# Patient Record
Sex: MALE | Race: BLACK OR AFRICAN AMERICAN | NOT HISPANIC OR LATINO | Employment: UNEMPLOYED | URBAN - METROPOLITAN AREA
[De-identification: names, ages, dates, MRNs, and addresses within clinical notes are randomized per-mention and may not be internally consistent; named-entity substitution may affect disease eponyms.]

---

## 2017-01-06 ENCOUNTER — COMMUNICATION - HEALTHEAST (OUTPATIENT)
Dept: INTERNAL MEDICINE | Facility: CLINIC | Age: 62
End: 2017-01-06

## 2017-01-14 ENCOUNTER — COMMUNICATION - HEALTHEAST (OUTPATIENT)
Dept: INTERNAL MEDICINE | Facility: CLINIC | Age: 62
End: 2017-01-14

## 2017-01-18 ENCOUNTER — OFFICE VISIT - HEALTHEAST (OUTPATIENT)
Dept: INTERNAL MEDICINE | Facility: CLINIC | Age: 62
End: 2017-01-18

## 2017-01-18 DIAGNOSIS — E11.9 TYPE 2 DIABETES MELLITUS (H): ICD-10-CM

## 2017-01-18 LAB — HBA1C MFR BLD: 7.8 % (ref 3.5–6)

## 2017-01-25 ENCOUNTER — COMMUNICATION - HEALTHEAST (OUTPATIENT)
Dept: INTERNAL MEDICINE | Facility: CLINIC | Age: 62
End: 2017-01-25

## 2017-01-26 ENCOUNTER — COMMUNICATION - HEALTHEAST (OUTPATIENT)
Dept: INTERNAL MEDICINE | Facility: CLINIC | Age: 62
End: 2017-01-26

## 2017-01-30 ENCOUNTER — COMMUNICATION - HEALTHEAST (OUTPATIENT)
Dept: EDUCATION SERVICES | Facility: CLINIC | Age: 62
End: 2017-01-30

## 2017-01-30 ENCOUNTER — AMBULATORY - HEALTHEAST (OUTPATIENT)
Dept: EDUCATION SERVICES | Facility: CLINIC | Age: 62
End: 2017-01-30

## 2017-01-30 DIAGNOSIS — E11.39 TYPE 2 DIABETES MELLITUS WITH OTHER OPHTHALMIC COMPLICATION, WITHOUT LONG-TERM CURRENT USE OF INSULIN (H): ICD-10-CM

## 2017-04-18 ENCOUNTER — COMMUNICATION - HEALTHEAST (OUTPATIENT)
Dept: INTERNAL MEDICINE | Facility: CLINIC | Age: 62
End: 2017-04-18

## 2017-04-18 DIAGNOSIS — E11.39 TYPE 2 DIABETES MELLITUS WITH OTHER OPHTHALMIC COMPLICATION, WITHOUT LONG-TERM CURRENT USE OF INSULIN (H): ICD-10-CM

## 2017-04-24 ENCOUNTER — AMBULATORY - HEALTHEAST (OUTPATIENT)
Dept: EDUCATION SERVICES | Facility: CLINIC | Age: 62
End: 2017-04-24

## 2017-04-24 ENCOUNTER — OFFICE VISIT - HEALTHEAST (OUTPATIENT)
Dept: INTERNAL MEDICINE | Facility: CLINIC | Age: 62
End: 2017-04-24

## 2017-04-24 DIAGNOSIS — E11.39 TYPE 2 DIABETES MELLITUS WITH OTHER OPHTHALMIC COMPLICATION, WITHOUT LONG-TERM CURRENT USE OF INSULIN (H): ICD-10-CM

## 2017-04-24 DIAGNOSIS — E11.9 TYPE 2 DIABETES MELLITUS WITHOUT COMPLICATION, WITHOUT LONG-TERM CURRENT USE OF INSULIN (H): ICD-10-CM

## 2017-04-24 DIAGNOSIS — R63.4 WEIGHT LOSS: ICD-10-CM

## 2017-04-24 LAB — HBA1C MFR BLD: 7 % (ref 3.5–6)

## 2017-04-24 ASSESSMENT — MIFFLIN-ST. JEOR: SCORE: 1552.36

## 2017-04-25 ENCOUNTER — COMMUNICATION - HEALTHEAST (OUTPATIENT)
Dept: INTERNAL MEDICINE | Facility: CLINIC | Age: 62
End: 2017-04-25

## 2017-04-25 ENCOUNTER — HOME CARE/HOSPICE - HEALTHEAST (OUTPATIENT)
Dept: HOME HEALTH SERVICES | Facility: HOME HEALTH | Age: 62
End: 2017-04-25

## 2017-04-25 DIAGNOSIS — H35.52 RETINITIS PIGMENTOSA: ICD-10-CM

## 2017-04-25 DIAGNOSIS — E11.9 T2DM (TYPE 2 DIABETES MELLITUS) (H): ICD-10-CM

## 2017-04-25 DIAGNOSIS — E87.6 HYPOKALEMIA: ICD-10-CM

## 2017-04-26 ENCOUNTER — COMMUNICATION - HEALTHEAST (OUTPATIENT)
Dept: HOME HEALTH SERVICES | Facility: HOME HEALTH | Age: 62
End: 2017-04-26

## 2017-04-27 ENCOUNTER — COMMUNICATION - HEALTHEAST (OUTPATIENT)
Dept: HOME HEALTH SERVICES | Facility: HOME HEALTH | Age: 62
End: 2017-04-27

## 2017-04-28 ENCOUNTER — COMMUNICATION - HEALTHEAST (OUTPATIENT)
Dept: ADMINISTRATIVE | Facility: CLINIC | Age: 62
End: 2017-04-28

## 2017-04-28 ENCOUNTER — HOME CARE/HOSPICE - HEALTHEAST (OUTPATIENT)
Dept: HOME HEALTH SERVICES | Facility: HOME HEALTH | Age: 62
End: 2017-04-28

## 2017-04-28 ENCOUNTER — COMMUNICATION - HEALTHEAST (OUTPATIENT)
Dept: SCHEDULING | Facility: CLINIC | Age: 62
End: 2017-04-28

## 2017-04-28 DIAGNOSIS — E11.39 TYPE 2 DIABETES MELLITUS WITH OTHER OPHTHALMIC COMPLICATION, WITHOUT LONG-TERM CURRENT USE OF INSULIN (H): ICD-10-CM

## 2017-04-30 ENCOUNTER — COMMUNICATION - HEALTHEAST (OUTPATIENT)
Dept: SCHEDULING | Facility: CLINIC | Age: 62
End: 2017-04-30

## 2017-05-01 ENCOUNTER — COMMUNICATION - HEALTHEAST (OUTPATIENT)
Dept: SCHEDULING | Facility: CLINIC | Age: 62
End: 2017-05-01

## 2017-05-01 ENCOUNTER — HOME CARE/HOSPICE - HEALTHEAST (OUTPATIENT)
Dept: HOME HEALTH SERVICES | Facility: HOME HEALTH | Age: 62
End: 2017-05-01

## 2017-05-01 ENCOUNTER — RECORDS - HEALTHEAST (OUTPATIENT)
Dept: ADMINISTRATIVE | Facility: OTHER | Age: 62
End: 2017-05-01

## 2017-05-01 DIAGNOSIS — E11.9 T2DM (TYPE 2 DIABETES MELLITUS) (H): ICD-10-CM

## 2017-05-02 ENCOUNTER — HOME CARE/HOSPICE - HEALTHEAST (OUTPATIENT)
Dept: HOME HEALTH SERVICES | Facility: HOME HEALTH | Age: 62
End: 2017-05-02

## 2017-05-03 ENCOUNTER — HOME CARE/HOSPICE - HEALTHEAST (OUTPATIENT)
Dept: HOME HEALTH SERVICES | Facility: HOME HEALTH | Age: 62
End: 2017-05-03

## 2017-05-04 ENCOUNTER — HOME CARE/HOSPICE - HEALTHEAST (OUTPATIENT)
Dept: HOME HEALTH SERVICES | Facility: HOME HEALTH | Age: 62
End: 2017-05-04

## 2017-05-08 ENCOUNTER — HOME CARE/HOSPICE - HEALTHEAST (OUTPATIENT)
Dept: HOME HEALTH SERVICES | Facility: HOME HEALTH | Age: 62
End: 2017-05-08

## 2017-05-10 ENCOUNTER — HOME CARE/HOSPICE - HEALTHEAST (OUTPATIENT)
Dept: HOME HEALTH SERVICES | Facility: HOME HEALTH | Age: 62
End: 2017-05-10

## 2017-05-15 ENCOUNTER — HOME CARE/HOSPICE - HEALTHEAST (OUTPATIENT)
Dept: HOME HEALTH SERVICES | Facility: HOME HEALTH | Age: 62
End: 2017-05-15

## 2017-05-16 ENCOUNTER — RECORDS - HEALTHEAST (OUTPATIENT)
Dept: ADMINISTRATIVE | Facility: OTHER | Age: 62
End: 2017-05-16

## 2017-05-16 ENCOUNTER — HOME CARE/HOSPICE - HEALTHEAST (OUTPATIENT)
Dept: HOME HEALTH SERVICES | Facility: HOME HEALTH | Age: 62
End: 2017-05-16

## 2017-05-17 ENCOUNTER — HOME CARE/HOSPICE - HEALTHEAST (OUTPATIENT)
Dept: HOME HEALTH SERVICES | Facility: HOME HEALTH | Age: 62
End: 2017-05-17

## 2017-05-22 ENCOUNTER — HOME CARE/HOSPICE - HEALTHEAST (OUTPATIENT)
Dept: HOME HEALTH SERVICES | Facility: HOME HEALTH | Age: 62
End: 2017-05-22

## 2017-05-23 ENCOUNTER — RECORDS - HEALTHEAST (OUTPATIENT)
Dept: ADMINISTRATIVE | Facility: OTHER | Age: 62
End: 2017-05-23

## 2017-05-23 ENCOUNTER — HOME CARE/HOSPICE - HEALTHEAST (OUTPATIENT)
Dept: HOME HEALTH SERVICES | Facility: HOME HEALTH | Age: 62
End: 2017-05-23

## 2017-05-24 ENCOUNTER — COMMUNICATION - HEALTHEAST (OUTPATIENT)
Dept: HOME HEALTH SERVICES | Facility: HOME HEALTH | Age: 62
End: 2017-05-24

## 2017-05-25 ENCOUNTER — HOME CARE/HOSPICE - HEALTHEAST (OUTPATIENT)
Dept: HOME HEALTH SERVICES | Facility: HOME HEALTH | Age: 62
End: 2017-05-25

## 2017-05-29 ENCOUNTER — HOME CARE/HOSPICE - HEALTHEAST (OUTPATIENT)
Dept: HOME HEALTH SERVICES | Facility: HOME HEALTH | Age: 62
End: 2017-05-29

## 2017-05-31 ENCOUNTER — HOME CARE/HOSPICE - HEALTHEAST (OUTPATIENT)
Dept: HOME HEALTH SERVICES | Facility: HOME HEALTH | Age: 62
End: 2017-05-31

## 2017-06-03 ENCOUNTER — HOME CARE/HOSPICE - HEALTHEAST (OUTPATIENT)
Dept: HOME HEALTH SERVICES | Facility: HOME HEALTH | Age: 62
End: 2017-06-03

## 2017-06-05 ENCOUNTER — HOME CARE/HOSPICE - HEALTHEAST (OUTPATIENT)
Dept: HOME HEALTH SERVICES | Facility: HOME HEALTH | Age: 62
End: 2017-06-05

## 2017-06-05 ENCOUNTER — COMMUNICATION - HEALTHEAST (OUTPATIENT)
Dept: HOME HEALTH SERVICES | Facility: HOME HEALTH | Age: 62
End: 2017-06-05

## 2017-06-12 ENCOUNTER — HOME CARE/HOSPICE - HEALTHEAST (OUTPATIENT)
Dept: HOME HEALTH SERVICES | Facility: HOME HEALTH | Age: 62
End: 2017-06-12

## 2017-06-14 ENCOUNTER — OFFICE VISIT - HEALTHEAST (OUTPATIENT)
Dept: FAMILY MEDICINE | Facility: CLINIC | Age: 62
End: 2017-06-14

## 2017-06-14 DIAGNOSIS — H35.52 PIGMENTARY RETINAL DYSTROPHY: ICD-10-CM

## 2017-06-14 DIAGNOSIS — E78.01 FAMILIAL HYPERCHOLESTEROLEMIA: ICD-10-CM

## 2017-06-14 DIAGNOSIS — R04.0 EPISTAXIS: ICD-10-CM

## 2017-06-14 DIAGNOSIS — H91.93 HEARING LOSS, BILATERAL: ICD-10-CM

## 2017-06-14 DIAGNOSIS — R61 UNEXPLAINED NIGHT SWEATS: ICD-10-CM

## 2017-06-14 DIAGNOSIS — R35.0 URINARY FREQUENCY: ICD-10-CM

## 2017-06-14 DIAGNOSIS — E11.39 TYPE 2 DIABETES MELLITUS WITH OTHER OPHTHALMIC COMPLICATION, WITHOUT LONG-TERM CURRENT USE OF INSULIN (H): ICD-10-CM

## 2017-06-14 ASSESSMENT — MIFFLIN-ST. JEOR: SCORE: 1586.39

## 2017-06-19 ENCOUNTER — HOME CARE/HOSPICE - HEALTHEAST (OUTPATIENT)
Dept: HOME HEALTH SERVICES | Facility: HOME HEALTH | Age: 62
End: 2017-06-19

## 2017-06-22 ENCOUNTER — OFFICE VISIT - HEALTHEAST (OUTPATIENT)
Dept: NURSING | Facility: CLINIC | Age: 62
End: 2017-06-22

## 2017-06-22 ENCOUNTER — HOME CARE/HOSPICE - HEALTHEAST (OUTPATIENT)
Dept: HOME HEALTH SERVICES | Facility: HOME HEALTH | Age: 62
End: 2017-06-22

## 2017-06-22 DIAGNOSIS — E11.39 TYPE 2 DIABETES MELLITUS WITH OTHER OPHTHALMIC COMPLICATION, WITHOUT LONG-TERM CURRENT USE OF INSULIN (H): ICD-10-CM

## 2017-06-22 DIAGNOSIS — R61 UNEXPLAINED NIGHT SWEATS: ICD-10-CM

## 2017-06-22 DIAGNOSIS — I10 ESSENTIAL HYPERTENSION WITH GOAL BLOOD PRESSURE LESS THAN 140/90: ICD-10-CM

## 2017-06-22 DIAGNOSIS — Z00.00 PREVENTATIVE HEALTH CARE: ICD-10-CM

## 2017-06-22 DIAGNOSIS — R80.9 MICROALBUMINURIA: ICD-10-CM

## 2017-06-22 LAB
CHOLEST SERPL-MCNC: 137 MG/DL
FASTING STATUS PATIENT QL REPORTED: NO
HDLC SERPL-MCNC: 39 MG/DL
LDLC SERPL CALC-MCNC: 80 MG/DL
TRIGL SERPL-MCNC: 88 MG/DL

## 2017-06-23 ENCOUNTER — COMMUNICATION - HEALTHEAST (OUTPATIENT)
Dept: NURSING | Facility: CLINIC | Age: 62
End: 2017-06-23

## 2017-06-26 ENCOUNTER — HOME CARE/HOSPICE - HEALTHEAST (OUTPATIENT)
Dept: HOME HEALTH SERVICES | Facility: HOME HEALTH | Age: 62
End: 2017-06-26

## 2017-07-20 ENCOUNTER — OFFICE VISIT - HEALTHEAST (OUTPATIENT)
Dept: FAMILY MEDICINE | Facility: CLINIC | Age: 62
End: 2017-07-20

## 2017-07-20 ENCOUNTER — AMBULATORY - HEALTHEAST (OUTPATIENT)
Dept: FAMILY MEDICINE | Facility: CLINIC | Age: 62
End: 2017-07-20

## 2017-07-20 DIAGNOSIS — E11.9 DIABETES MELLITUS (H): ICD-10-CM

## 2017-07-20 DIAGNOSIS — B35.1 ONYCHOMYCOSIS OF TOENAIL: ICD-10-CM

## 2017-07-20 DIAGNOSIS — H35.52 PIGMENTARY RETINAL DYSTROPHY: ICD-10-CM

## 2017-07-20 LAB — HBA1C MFR BLD: 7.2 % (ref 3.5–6)

## 2017-07-20 ASSESSMENT — MIFFLIN-ST. JEOR: SCORE: 1586.39

## 2017-07-31 ENCOUNTER — OFFICE VISIT - HEALTHEAST (OUTPATIENT)
Dept: PODIATRY | Facility: CLINIC | Age: 62
End: 2017-07-31

## 2017-07-31 DIAGNOSIS — L60.2 ONYCHAUXIS: ICD-10-CM

## 2017-07-31 DIAGNOSIS — B35.1 NAIL FUNGUS: ICD-10-CM

## 2017-08-12 ENCOUNTER — COMMUNICATION - HEALTHEAST (OUTPATIENT)
Dept: FAMILY MEDICINE | Facility: CLINIC | Age: 62
End: 2017-08-12

## 2017-08-12 DIAGNOSIS — F10.920 ALCOHOL INTOXICATION, UNCOMPLICATED (H): ICD-10-CM

## 2017-08-24 ENCOUNTER — COMMUNICATION - HEALTHEAST (OUTPATIENT)
Dept: ADMINISTRATIVE | Facility: CLINIC | Age: 62
End: 2017-08-24

## 2017-08-29 ENCOUNTER — COMMUNICATION - HEALTHEAST (OUTPATIENT)
Dept: ADMINISTRATIVE | Facility: CLINIC | Age: 62
End: 2017-08-29

## 2017-09-07 ENCOUNTER — COMMUNICATION - HEALTHEAST (OUTPATIENT)
Dept: FAMILY MEDICINE | Facility: CLINIC | Age: 62
End: 2017-09-07

## 2017-09-19 ENCOUNTER — COMMUNICATION - HEALTHEAST (OUTPATIENT)
Dept: SCHEDULING | Facility: CLINIC | Age: 62
End: 2017-09-19

## 2017-10-10 ENCOUNTER — COMMUNICATION - HEALTHEAST (OUTPATIENT)
Dept: SCHEDULING | Facility: CLINIC | Age: 62
End: 2017-10-10

## 2017-10-10 DIAGNOSIS — I10 HTN (HYPERTENSION): ICD-10-CM

## 2017-10-10 DIAGNOSIS — E11.9 DIABETES (H): ICD-10-CM

## 2017-10-20 ENCOUNTER — AMBULATORY - HEALTHEAST (OUTPATIENT)
Dept: LAB | Facility: CLINIC | Age: 62
End: 2017-10-20

## 2017-10-20 ENCOUNTER — COMMUNICATION - HEALTHEAST (OUTPATIENT)
Dept: FAMILY MEDICINE | Facility: CLINIC | Age: 62
End: 2017-10-20

## 2017-10-20 DIAGNOSIS — E78.5 HYPERLIPIDEMIA: ICD-10-CM

## 2017-10-20 DIAGNOSIS — E11.9 DIABETES (H): ICD-10-CM

## 2017-10-20 DIAGNOSIS — I10 HTN (HYPERTENSION): ICD-10-CM

## 2017-10-20 LAB — HBA1C MFR BLD: 6.5 % (ref 3.5–6)

## 2017-11-07 ENCOUNTER — COMMUNICATION - HEALTHEAST (OUTPATIENT)
Dept: FAMILY MEDICINE | Facility: CLINIC | Age: 62
End: 2017-11-07

## 2017-11-14 ENCOUNTER — COMMUNICATION - HEALTHEAST (OUTPATIENT)
Dept: FAMILY MEDICINE | Facility: CLINIC | Age: 62
End: 2017-11-14

## 2017-11-14 ENCOUNTER — OFFICE VISIT - HEALTHEAST (OUTPATIENT)
Dept: FAMILY MEDICINE | Facility: CLINIC | Age: 62
End: 2017-11-14

## 2017-11-14 ENCOUNTER — AMBULATORY - HEALTHEAST (OUTPATIENT)
Dept: FAMILY MEDICINE | Facility: CLINIC | Age: 62
End: 2017-11-14

## 2017-11-14 DIAGNOSIS — R63.0 APPETITE LOSS: ICD-10-CM

## 2017-11-14 DIAGNOSIS — E11.9 T2DM (TYPE 2 DIABETES MELLITUS) (H): ICD-10-CM

## 2017-11-14 DIAGNOSIS — Z23 NEED FOR IMMUNIZATION AGAINST INFLUENZA: ICD-10-CM

## 2017-11-14 DIAGNOSIS — H35.52 PIGMENTARY RETINAL DYSTROPHY: ICD-10-CM

## 2017-11-14 DIAGNOSIS — H61.20 CERUMEN IMPACTION: ICD-10-CM

## 2017-11-14 DIAGNOSIS — H91.90 HEARING LOSS: ICD-10-CM

## 2017-11-14 ASSESSMENT — MIFFLIN-ST. JEOR: SCORE: 1554.64

## 2018-01-22 ENCOUNTER — AMBULATORY - HEALTHEAST (OUTPATIENT)
Dept: PODIATRY | Facility: CLINIC | Age: 63
End: 2018-01-22

## 2018-01-22 DIAGNOSIS — L60.2 ONYCHAUXIS: ICD-10-CM

## 2018-01-22 DIAGNOSIS — E11.9 TYPE 2 DIABETES MELLITUS WITHOUT COMPLICATION, WITHOUT LONG-TERM CURRENT USE OF INSULIN (H): ICD-10-CM

## 2018-01-22 DIAGNOSIS — B35.1 NAIL FUNGUS: ICD-10-CM

## 2018-01-29 ENCOUNTER — COMMUNICATION - HEALTHEAST (OUTPATIENT)
Dept: FAMILY MEDICINE | Facility: CLINIC | Age: 63
End: 2018-01-29

## 2018-01-29 DIAGNOSIS — E78.5 HYPERLIPIDEMIA: ICD-10-CM

## 2018-02-07 ENCOUNTER — AMBULATORY - HEALTHEAST (OUTPATIENT)
Dept: FAMILY MEDICINE | Facility: CLINIC | Age: 63
End: 2018-02-07

## 2018-02-07 ENCOUNTER — COMMUNICATION - HEALTHEAST (OUTPATIENT)
Dept: FAMILY MEDICINE | Facility: CLINIC | Age: 63
End: 2018-02-07

## 2018-02-07 ENCOUNTER — OFFICE VISIT - HEALTHEAST (OUTPATIENT)
Dept: FAMILY MEDICINE | Facility: CLINIC | Age: 63
End: 2018-02-07

## 2018-02-07 DIAGNOSIS — E11.39 TYPE 2 DIABETES MELLITUS WITH OTHER OPHTHALMIC COMPLICATION, WITHOUT LONG-TERM CURRENT USE OF INSULIN (H): ICD-10-CM

## 2018-02-07 DIAGNOSIS — R42 DIZZINESS: ICD-10-CM

## 2018-02-07 DIAGNOSIS — F32.A DEPRESSIVE DISORDER: ICD-10-CM

## 2018-02-07 DIAGNOSIS — E11.9 DIABETES MELLITUS TYPE 2 IN NONOBESE (H): ICD-10-CM

## 2018-02-07 DIAGNOSIS — H35.52 PIGMENTARY RETINAL DYSTROPHY: ICD-10-CM

## 2018-02-07 DIAGNOSIS — N52.9 IMPOTENCE: ICD-10-CM

## 2018-02-07 LAB — HBA1C MFR BLD: 6.9 % (ref 3.5–6)

## 2018-02-07 ASSESSMENT — MIFFLIN-ST. JEOR: SCORE: 1519.48

## 2018-02-13 ENCOUNTER — COMMUNICATION - HEALTHEAST (OUTPATIENT)
Dept: FAMILY MEDICINE | Facility: CLINIC | Age: 63
End: 2018-02-13

## 2018-04-02 ENCOUNTER — COMMUNICATION - HEALTHEAST (OUTPATIENT)
Dept: FAMILY MEDICINE | Facility: CLINIC | Age: 63
End: 2018-04-02

## 2018-04-02 DIAGNOSIS — F10.920 ALCOHOL INTOXICATION, UNCOMPLICATED (H): ICD-10-CM

## 2018-04-23 ENCOUNTER — AMBULATORY - HEALTHEAST (OUTPATIENT)
Dept: PODIATRY | Facility: CLINIC | Age: 63
End: 2018-04-23

## 2018-04-23 DIAGNOSIS — L60.2 ONYCHAUXIS: ICD-10-CM

## 2018-04-23 DIAGNOSIS — E11.9 TYPE 2 DIABETES MELLITUS WITHOUT COMPLICATION, WITHOUT LONG-TERM CURRENT USE OF INSULIN (H): ICD-10-CM

## 2018-04-23 DIAGNOSIS — B35.1 NAIL FUNGUS: ICD-10-CM

## 2018-04-26 ENCOUNTER — OFFICE VISIT - HEALTHEAST (OUTPATIENT)
Dept: FAMILY MEDICINE | Facility: CLINIC | Age: 63
End: 2018-04-26

## 2018-04-26 DIAGNOSIS — H91.90 HEARING LOSS: ICD-10-CM

## 2018-04-26 DIAGNOSIS — E11.39 TYPE 2 DIABETES MELLITUS WITH OTHER OPHTHALMIC COMPLICATION, WITHOUT LONG-TERM CURRENT USE OF INSULIN (H): ICD-10-CM

## 2018-04-26 DIAGNOSIS — F32.A DEPRESSIVE DISORDER: ICD-10-CM

## 2018-04-26 DIAGNOSIS — H35.52 PIGMENTARY RETINAL DYSTROPHY: ICD-10-CM

## 2018-04-26 ASSESSMENT — MIFFLIN-ST. JEOR: SCORE: 1553.5

## 2018-05-18 ENCOUNTER — OFFICE VISIT - HEALTHEAST (OUTPATIENT)
Dept: FAMILY MEDICINE | Facility: CLINIC | Age: 63
End: 2018-05-18

## 2018-05-18 ENCOUNTER — COMMUNICATION - HEALTHEAST (OUTPATIENT)
Dept: FAMILY MEDICINE | Facility: CLINIC | Age: 63
End: 2018-05-18

## 2018-05-18 DIAGNOSIS — E78.01 FAMILIAL HYPERCHOLESTEROLEMIA: ICD-10-CM

## 2018-05-18 DIAGNOSIS — F10.19 ALCOHOL ABUSE WITH ALCOHOL-INDUCED DISORDER (H): ICD-10-CM

## 2018-05-18 DIAGNOSIS — R35.0 URINARY FREQUENCY: ICD-10-CM

## 2018-05-18 DIAGNOSIS — R19.00 ABDOMINAL MASS: ICD-10-CM

## 2018-05-18 DIAGNOSIS — R04.0 BLEEDING NOSE: ICD-10-CM

## 2018-05-18 DIAGNOSIS — K92.2 GI BLEED: ICD-10-CM

## 2018-05-18 DIAGNOSIS — E11.9 DIABETES MELLITUS (H): ICD-10-CM

## 2018-05-18 DIAGNOSIS — R42 DIZZINESS: ICD-10-CM

## 2018-05-18 DIAGNOSIS — E11.39 TYPE 2 DIABETES MELLITUS WITH OTHER OPHTHALMIC COMPLICATION, WITHOUT LONG-TERM CURRENT USE OF INSULIN (H): ICD-10-CM

## 2018-05-18 DIAGNOSIS — H35.52 PIGMENTARY RETINAL DYSTROPHY: ICD-10-CM

## 2018-05-18 LAB
ALBUMIN UR-MCNC: NEGATIVE MG/DL
ANION GAP SERPL CALCULATED.3IONS-SCNC: 10 MMOL/L (ref 5–18)
APPEARANCE UR: CLEAR
BASOPHILS # BLD AUTO: 0 THOU/UL (ref 0–0.2)
BASOPHILS NFR BLD AUTO: 0 % (ref 0–2)
BILIRUB UR QL STRIP: NEGATIVE
BUN SERPL-MCNC: 6 MG/DL (ref 8–22)
CALCIUM SERPL-MCNC: 8.8 MG/DL (ref 8.5–10.5)
CHLORIDE BLD-SCNC: 102 MMOL/L (ref 98–107)
CHOLEST SERPL-MCNC: 154 MG/DL
CO2 SERPL-SCNC: 23 MMOL/L (ref 22–31)
COLOR UR AUTO: YELLOW
CREAT SERPL-MCNC: 0.69 MG/DL (ref 0.7–1.3)
EOSINOPHIL # BLD AUTO: 0.2 THOU/UL (ref 0–0.4)
EOSINOPHIL NFR BLD AUTO: 2 % (ref 0–6)
ERYTHROCYTE [DISTWIDTH] IN BLOOD BY AUTOMATED COUNT: 13.1 % (ref 11–14.5)
FASTING STATUS PATIENT QL REPORTED: NO
GFR SERPL CREATININE-BSD FRML MDRD: >60 ML/MIN/1.73M2
GLUCOSE BLD-MCNC: 115 MG/DL (ref 70–125)
GLUCOSE UR STRIP-MCNC: ABNORMAL MG/DL
HBA1C MFR BLD: 6.3 % (ref 3.5–6)
HCT VFR BLD AUTO: 28 % (ref 40–54)
HDLC SERPL-MCNC: 45 MG/DL
HGB BLD-MCNC: 9.3 G/DL (ref 14–18)
HGB UR QL STRIP: NEGATIVE
KETONES UR STRIP-MCNC: NEGATIVE MG/DL
LDLC SERPL CALC-MCNC: 96 MG/DL
LEUKOCYTE ESTERASE UR QL STRIP: NEGATIVE
LYMPHOCYTES # BLD AUTO: 2.4 THOU/UL (ref 0.8–4.4)
LYMPHOCYTES NFR BLD AUTO: 29 % (ref 20–40)
MCH RBC QN AUTO: 30.7 PG (ref 27–34)
MCHC RBC AUTO-ENTMCNC: 33.3 G/DL (ref 32–36)
MCV RBC AUTO: 92 FL (ref 80–100)
MONOCYTES # BLD AUTO: 1 THOU/UL (ref 0–0.9)
MONOCYTES NFR BLD AUTO: 11 % (ref 2–10)
NEUTROPHILS # BLD AUTO: 5 THOU/UL (ref 2–7.7)
NEUTROPHILS NFR BLD AUTO: 58 % (ref 50–70)
NITRATE UR QL: NEGATIVE
PH UR STRIP: 7 [PH] (ref 5–8)
PLATELET # BLD AUTO: 414 THOU/UL (ref 140–440)
PMV BLD AUTO: 7.6 FL (ref 7–10)
POTASSIUM BLD-SCNC: 4.6 MMOL/L (ref 3.5–5)
RBC # BLD AUTO: 3.04 MILL/UL (ref 4.4–6.2)
SODIUM SERPL-SCNC: 135 MMOL/L (ref 136–145)
SP GR UR STRIP: 1.02 (ref 1–1.03)
TRIGL SERPL-MCNC: 67 MG/DL
UROBILINOGEN UR STRIP-ACNC: ABNORMAL
WBC: 8.6 THOU/UL (ref 4–11)

## 2018-05-18 ASSESSMENT — MIFFLIN-ST. JEOR: SCORE: 1576.18

## 2018-05-21 ENCOUNTER — TRANSFERRED RECORDS (OUTPATIENT)
Dept: HEALTH INFORMATION MANAGEMENT | Facility: CLINIC | Age: 63
End: 2018-05-21

## 2018-05-25 ENCOUNTER — HOSPITAL ENCOUNTER (OUTPATIENT)
Dept: CT IMAGING | Facility: CLINIC | Age: 63
Discharge: HOME | End: 2018-05-25
Attending: FAMILY MEDICINE

## 2018-05-25 DIAGNOSIS — R19.00 ABDOMINAL MASS: ICD-10-CM

## 2018-05-25 DIAGNOSIS — R42 DIZZINESS: ICD-10-CM

## 2018-05-25 DIAGNOSIS — H35.52 PIGMENTARY RETINAL DYSTROPHY: ICD-10-CM

## 2018-05-25 DIAGNOSIS — E11.39 TYPE 2 DIABETES MELLITUS WITH OTHER OPHTHALMIC COMPLICATION, WITHOUT LONG-TERM CURRENT USE OF INSULIN (H): ICD-10-CM

## 2018-05-25 DIAGNOSIS — F10.19 ALCOHOL ABUSE WITH ALCOHOL-INDUCED DISORDER (H): ICD-10-CM

## 2018-06-28 ENCOUNTER — COMMUNICATION - HEALTHEAST (OUTPATIENT)
Dept: FAMILY MEDICINE | Facility: CLINIC | Age: 63
End: 2018-06-28

## 2018-07-17 ENCOUNTER — COMMUNICATION - HEALTHEAST (OUTPATIENT)
Dept: FAMILY MEDICINE | Facility: CLINIC | Age: 63
End: 2018-07-17

## 2018-07-17 DIAGNOSIS — J30.2 SEASONAL ALLERGIC RHINITIS: ICD-10-CM

## 2018-07-26 ENCOUNTER — MEDICAL CORRESPONDENCE (OUTPATIENT)
Dept: HEALTH INFORMATION MANAGEMENT | Facility: CLINIC | Age: 63
End: 2018-07-26

## 2018-08-04 LAB — HEMOCCULT STL QL IA: NEGATIVE

## 2018-08-14 ENCOUNTER — COMMUNICATION - HEALTHEAST (OUTPATIENT)
Dept: SCHEDULING | Facility: CLINIC | Age: 63
End: 2018-08-14

## 2018-08-20 ENCOUNTER — TRANSFERRED RECORDS (OUTPATIENT)
Dept: HEALTH INFORMATION MANAGEMENT | Facility: CLINIC | Age: 63
End: 2018-08-20

## 2018-08-21 ENCOUNTER — COMMUNICATION - HEALTHEAST (OUTPATIENT)
Dept: ADMINISTRATIVE | Facility: CLINIC | Age: 63
End: 2018-08-21

## 2018-08-21 DIAGNOSIS — E11.9 DIABETES MELLITUS, TYPE 2 (H): ICD-10-CM

## 2018-08-21 DIAGNOSIS — H35.033 BLIND HYPERTENSIVE EYE, BILATERAL: ICD-10-CM

## 2018-08-21 DIAGNOSIS — H35.52 RETINITIS PIGMENTOSA OF BOTH EYES: ICD-10-CM

## 2018-09-11 ENCOUNTER — HOSPITAL ENCOUNTER (OUTPATIENT)
Dept: BEHAVIORAL HEALTH | Facility: CLINIC | Age: 63
Discharge: HOME | End: 2018-09-11
Attending: PSYCHIATRY & NEUROLOGY | Admitting: PSYCHIATRY & NEUROLOGY
Payer: MEDICARE

## 2018-09-11 ENCOUNTER — BEH TREATMENT PLAN (OUTPATIENT)
Dept: BEHAVIORAL HEALTH | Facility: CLINIC | Age: 63
End: 2018-09-11

## 2018-09-11 PROCEDURE — 90791 PSYCH DIAGNOSTIC EVALUATION: CPT

## 2018-09-11 NOTE — PROGRESS NOTES
Yeyo came to the diagnostic assessment appointment and was struggling to be able to fill out the paperwork.  Our  was attempting to help him and he reported that he did not know why he was here for the appointment.  I went down to meet with him and find out how he would like to proceed.  He reported that he did not want to come to a program that met every day.  He reported that he felt that this would be too much to be out of his apartment and that leaving his apartment was a struggle because he was blind and hard of hearing.  He reported that while at [facility] that they did not discuss this referral for the MI CD program with him.  I talked with him about all of the programs that we had to offer and he reported that he was not interested in any of the programs and is more interested in getting home health care set up to help him clean his apartment and with his laundry.  He expressed that he was concerned that his  would think that he blew off the appointment.  I had him sign the release to his  and let him know that I would call and confirm that he did come for the appointment, but did not feel that our programs were a fit for him.  Helped him to call [employer] to attempt to get a return ride earlier.

## 2018-09-12 ENCOUNTER — COMMUNICATION - HEALTHEAST (OUTPATIENT)
Dept: CARE COORDINATION | Facility: CLINIC | Age: 63
End: 2018-09-12

## 2018-09-14 ENCOUNTER — OFFICE VISIT - HEALTHEAST (OUTPATIENT)
Dept: FAMILY MEDICINE | Facility: CLINIC | Age: 63
End: 2018-09-14

## 2018-09-14 DIAGNOSIS — E11.39 TYPE 2 DIABETES MELLITUS WITH OTHER OPHTHALMIC COMPLICATION, WITHOUT LONG-TERM CURRENT USE OF INSULIN (H): ICD-10-CM

## 2018-09-14 DIAGNOSIS — F10.20 SEVERE ALCOHOL USE DISORDER (H): ICD-10-CM

## 2018-09-14 DIAGNOSIS — F10.19 ALCOHOL ABUSE WITH ALCOHOL-INDUCED DISORDER (H): ICD-10-CM

## 2018-09-14 DIAGNOSIS — F19.94 SUBSTANCE INDUCED MOOD DISORDER (H): ICD-10-CM

## 2018-09-14 DIAGNOSIS — H90.3 SENSORINEURAL HEARING LOSS (SNHL) OF BOTH EARS: ICD-10-CM

## 2018-09-14 ASSESSMENT — MIFFLIN-ST. JEOR: SCORE: 1572.78

## 2018-10-09 ENCOUNTER — COMMUNICATION - HEALTHEAST (OUTPATIENT)
Dept: FAMILY MEDICINE | Facility: CLINIC | Age: 63
End: 2018-10-09

## 2018-10-09 ENCOUNTER — COMMUNICATION - HEALTHEAST (OUTPATIENT)
Dept: BEHAVIORAL HEALTH | Facility: CLINIC | Age: 63
End: 2018-10-09

## 2018-10-09 DIAGNOSIS — I10 ESSENTIAL HYPERTENSION: ICD-10-CM

## 2018-10-09 DIAGNOSIS — R35.0 URINARY FREQUENCY: ICD-10-CM

## 2018-10-09 DIAGNOSIS — E11.39 TYPE 2 DIABETES MELLITUS WITH OTHER OPHTHALMIC COMPLICATION, WITHOUT LONG-TERM CURRENT USE OF INSULIN (H): ICD-10-CM

## 2018-10-16 ENCOUNTER — COMMUNICATION - HEALTHEAST (OUTPATIENT)
Dept: FAMILY MEDICINE | Facility: CLINIC | Age: 63
End: 2018-10-16

## 2018-10-16 DIAGNOSIS — E11.39 TYPE 2 DIABETES MELLITUS WITH OTHER OPHTHALMIC COMPLICATION, WITHOUT LONG-TERM CURRENT USE OF INSULIN (H): ICD-10-CM

## 2018-10-16 DIAGNOSIS — I10 ESSENTIAL HYPERTENSION: ICD-10-CM

## 2018-10-30 ENCOUNTER — COMMUNICATION - HEALTHEAST (OUTPATIENT)
Dept: FAMILY MEDICINE | Facility: CLINIC | Age: 63
End: 2018-10-30

## 2018-10-30 DIAGNOSIS — E11.39 TYPE 2 DIABETES MELLITUS WITH OTHER OPHTHALMIC COMPLICATION, WITHOUT LONG-TERM CURRENT USE OF INSULIN (H): ICD-10-CM

## 2018-10-30 DIAGNOSIS — I10 ESSENTIAL HYPERTENSION: ICD-10-CM

## 2019-01-01 ENCOUNTER — COMMUNICATION - HEALTHEAST (OUTPATIENT)
Dept: FAMILY MEDICINE | Facility: CLINIC | Age: 64
End: 2019-01-01

## 2019-01-01 ENCOUNTER — OFFICE VISIT (OUTPATIENT)
Dept: FAMILY MEDICINE | Facility: CLINIC | Age: 64
End: 2019-01-01
Payer: MEDICARE

## 2019-01-01 ENCOUNTER — COMMUNICATION - HEALTHEAST (OUTPATIENT)
Dept: BEHAVIORAL HEALTH | Facility: CLINIC | Age: 64
End: 2019-01-01

## 2019-01-01 ENCOUNTER — OFFICE VISIT - HEALTHEAST (OUTPATIENT)
Dept: FAMILY MEDICINE | Facility: CLINIC | Age: 64
End: 2019-01-01

## 2019-01-01 ENCOUNTER — AMBULATORY - HEALTHEAST (OUTPATIENT)
Dept: FAMILY MEDICINE | Facility: CLINIC | Age: 64
End: 2019-01-01

## 2019-01-01 ENCOUNTER — COMMUNICATION - HEALTHEAST (OUTPATIENT)
Dept: ADMINISTRATIVE | Facility: CLINIC | Age: 64
End: 2019-01-01

## 2019-01-01 VITALS
RESPIRATION RATE: 20 BRPM | HEART RATE: 74 BPM | TEMPERATURE: 97.8 F | WEIGHT: 159 LBS | OXYGEN SATURATION: 100 % | SYSTOLIC BLOOD PRESSURE: 147 MMHG | DIASTOLIC BLOOD PRESSURE: 62 MMHG

## 2019-01-01 DIAGNOSIS — N42.9 ABNORMAL PROSTATE BY PALPATION: ICD-10-CM

## 2019-01-01 DIAGNOSIS — H54.7 VISION LOSS: ICD-10-CM

## 2019-01-01 DIAGNOSIS — Z71.89 ADVANCED DIRECTIVES, COUNSELING/DISCUSSION: ICD-10-CM

## 2019-01-01 DIAGNOSIS — E87.6 HYPOKALEMIA: ICD-10-CM

## 2019-01-01 DIAGNOSIS — H35.52 PIGMENTARY RETINAL DYSTROPHY: ICD-10-CM

## 2019-01-01 DIAGNOSIS — Z12.5 SCREENING FOR PROSTATE CANCER: ICD-10-CM

## 2019-01-01 DIAGNOSIS — D50.8 IRON DEFICIENCY ANEMIA SECONDARY TO INADEQUATE DIETARY IRON INTAKE: ICD-10-CM

## 2019-01-01 DIAGNOSIS — E11.39 TYPE 2 DIABETES MELLITUS WITH OTHER OPHTHALMIC COMPLICATION, WITHOUT LONG-TERM CURRENT USE OF INSULIN (H): ICD-10-CM

## 2019-01-01 DIAGNOSIS — E11.9 TYPE 2 DIABETES MELLITUS (H): ICD-10-CM

## 2019-01-01 DIAGNOSIS — E78.01 FAMILIAL HYPERCHOLESTEROLEMIA: ICD-10-CM

## 2019-01-01 DIAGNOSIS — Z11.4 SCREENING FOR HIV (HUMAN IMMUNODEFICIENCY VIRUS): ICD-10-CM

## 2019-01-01 DIAGNOSIS — E11.65 TYPE 2 DIABETES MELLITUS WITH HYPERGLYCEMIA, WITH LONG-TERM CURRENT USE OF INSULIN (H): Primary | ICD-10-CM

## 2019-01-01 DIAGNOSIS — Z00.00 ROUTINE GENERAL MEDICAL EXAMINATION AT A HEALTH CARE FACILITY: ICD-10-CM

## 2019-01-01 DIAGNOSIS — N52.1 ERECTILE DYSFUNCTION DUE TO DISEASES CLASSIFIED ELSEWHERE: ICD-10-CM

## 2019-01-01 DIAGNOSIS — Z79.4 TYPE 2 DIABETES MELLITUS WITH HYPERGLYCEMIA, WITH LONG-TERM CURRENT USE OF INSULIN (H): Primary | ICD-10-CM

## 2019-01-01 DIAGNOSIS — Z23 NEED FOR IMMUNIZATION AGAINST INFLUENZA: ICD-10-CM

## 2019-01-01 DIAGNOSIS — Z13.9 SCREENING FOR CONDITION: ICD-10-CM

## 2019-01-01 DIAGNOSIS — R35.0 BENIGN PROSTATIC HYPERPLASIA WITH URINARY FREQUENCY: ICD-10-CM

## 2019-01-01 DIAGNOSIS — Z80.42 FAMILY HISTORY OF PROSTATE CANCER: ICD-10-CM

## 2019-01-01 DIAGNOSIS — F10.920 ALCOHOL INTOXICATION, UNCOMPLICATED (H): ICD-10-CM

## 2019-01-01 DIAGNOSIS — I10 ESSENTIAL HYPERTENSION, BENIGN: ICD-10-CM

## 2019-01-01 DIAGNOSIS — B35.3 TINEA PEDIS OF BOTH FEET: ICD-10-CM

## 2019-01-01 DIAGNOSIS — E78.5 HYPERLIPIDEMIA: ICD-10-CM

## 2019-01-01 DIAGNOSIS — R39.198 DIFFICULTY URINATING: ICD-10-CM

## 2019-01-01 DIAGNOSIS — N40.1 BENIGN PROSTATIC HYPERPLASIA WITH URINARY FREQUENCY: ICD-10-CM

## 2019-01-01 LAB
ALBUMIN SERPL-MCNC: 3.7 G/DL (ref 3.5–5)
ALP SERPL-CCNC: 79 U/L (ref 45–120)
ALT SERPL W P-5'-P-CCNC: 13 U/L (ref 0–45)
ANION GAP SERPL CALCULATED.3IONS-SCNC: 9 MMOL/L (ref 5–18)
AST SERPL W P-5'-P-CCNC: 16 U/L (ref 0–40)
BASOPHILS # BLD AUTO: 0 THOU/UL (ref 0–0.2)
BASOPHILS NFR BLD AUTO: 1 % (ref 0–2)
BILIRUB SERPL-MCNC: 0.4 MG/DL (ref 0–1)
BUN SERPL-MCNC: 10 MG/DL (ref 8–22)
BUN SERPL-MCNC: 6 MG/DL (ref 7–30)
CALCIUM SERPL-MCNC: 9 MG/DL (ref 8.5–10.4)
CALCIUM SERPL-MCNC: 9.8 MG/DL (ref 8.5–10.5)
CHLORIDE BLD-SCNC: 99 MMOL/L (ref 98–107)
CHLORIDE SERPLBLD-SCNC: 101 MMOL/L (ref 94–109)
CHOLEST SERPL-MCNC: 167 MG/DL
CO2 SERPL-SCNC: 25 MMOL/L (ref 22–31)
CO2 SERPL-SCNC: 32 MMOL/L (ref 20–32)
CREAT SERPL-MCNC: 0.77 MG/DL (ref 0.7–1.3)
CREAT SERPL-MCNC: 0.8 MG/DL (ref 0.8–1.5)
CREAT UR-MCNC: 130.4 MG/DL
EGFR CALCULATED (BLACK REFERENCE): >90 ML/MIN
EGFR CALCULATED (NON BLACK REFERENCE): >90 ML/MIN
EOSINOPHIL # BLD AUTO: 0.1 THOU/UL (ref 0–0.4)
EOSINOPHIL NFR BLD AUTO: 2 % (ref 0–6)
ERYTHROCYTE [DISTWIDTH] IN BLOOD BY AUTOMATED COUNT: 12.4 % (ref 11–14.5)
FASTING STATUS PATIENT QL REPORTED: NO
GFR SERPL CREATININE-BSD FRML MDRD: >60 ML/MIN/1.73M2
GLUCOSE BLD-MCNC: 143 MG/DL (ref 70–125)
GLUCOSE SERPL-MCNC: 141 MG/DL (ref 60–109)
HBA1C MFR BLD: 5.6 % (ref 3.5–6)
HBA1C MFR BLD: 5.8 % (ref 3.5–6)
HCT VFR BLD AUTO: 32.8 % (ref 40–54)
HCV AB SER QL: NEGATIVE
HDLC SERPL-MCNC: 60 MG/DL
HGB BLD-MCNC: 11.6 G/DL (ref 14–18)
HIV 1+2 AB+HIV1 P24 AG SERPL QL IA: NEGATIVE
LDLC SERPL CALC-MCNC: 87 MG/DL
LYMPHOCYTES # BLD AUTO: 1.9 THOU/UL (ref 0.8–4.4)
LYMPHOCYTES NFR BLD AUTO: 26 % (ref 20–40)
MCH RBC QN AUTO: 34.3 PG (ref 27–34)
MCHC RBC AUTO-ENTMCNC: 35.2 G/DL (ref 32–36)
MCV RBC AUTO: 97 FL (ref 80–100)
MICROALBUMIN UR-MCNC: 3.16 MG/DL (ref 0–1.99)
MICROALBUMIN/CREAT UR: 24.2 MG/G
MONOCYTES # BLD AUTO: 1.1 THOU/UL (ref 0–0.9)
MONOCYTES NFR BLD AUTO: 15 % (ref 2–10)
NEUTROPHILS # BLD AUTO: 4.1 THOU/UL (ref 2–7.7)
NEUTROPHILS NFR BLD AUTO: 57 % (ref 50–70)
PLATELET # BLD AUTO: 345 THOU/UL (ref 140–440)
PMV BLD AUTO: 7.3 FL (ref 7–10)
POTASSIUM BLD-SCNC: 5 MMOL/L (ref 3.5–5)
POTASSIUM SERPL-SCNC: 3.2 MMOL/L (ref 3.4–5.3)
PROT SERPL-MCNC: 7 G/DL (ref 6–8)
RBC # BLD AUTO: 3.37 MILL/UL (ref 4.4–6.2)
SODIUM SERPL-SCNC: 133 MMOL/L (ref 136–145)
SODIUM SERPL-SCNC: 139 MMOL/L (ref 133–144)
TRIGL SERPL-MCNC: 102 MG/DL
WBC: 7.2 THOU/UL (ref 4–11)

## 2019-01-01 RX ORDER — POTASSIUM CHLORIDE 1500 MG/1
20 TABLET, EXTENDED RELEASE ORAL DAILY
Qty: 30 TABLET | Refills: 0 | Status: SHIPPED | OUTPATIENT
Start: 2019-01-01 | End: 2019-01-01

## 2019-01-01 RX ORDER — AMLODIPINE BESYLATE 2.5 MG/1
2.5 TABLET ORAL
COMMUNITY
Start: 2019-04-15

## 2019-01-01 RX ORDER — MULTIVIT WITH MINERALS/LUTEIN
1000 TABLET ORAL
COMMUNITY

## 2019-01-01 RX ORDER — POTASSIUM CHLORIDE 1500 MG/1
20 TABLET, EXTENDED RELEASE ORAL DAILY
Qty: 30 TABLET | Refills: 11 | Status: SHIPPED | OUTPATIENT
Start: 2019-01-01

## 2019-01-01 RX ORDER — ALCOHOL 70.47 ML/100ML
1 GEL TOPICAL
COMMUNITY

## 2019-01-01 RX ORDER — ATORVASTATIN CALCIUM 10 MG/1
TABLET, FILM COATED ORAL
COMMUNITY
Start: 2018-01-29

## 2019-01-01 RX ORDER — MECLIZINE HCL 12.5 MG 12.5 MG/1
TABLET ORAL
Refills: 3 | COMMUNITY
Start: 2018-06-01

## 2019-01-01 RX ORDER — GLIPIZIDE 5 MG/1
2.5 TABLET ORAL
COMMUNITY
Start: 2019-04-16

## 2019-01-01 RX ORDER — ASPIRIN 81 MG/1
81 TABLET ORAL
COMMUNITY

## 2019-01-01 RX ORDER — SILDENAFIL 100 MG/1
100 TABLET, FILM COATED ORAL
COMMUNITY

## 2019-01-01 ASSESSMENT — MIFFLIN-ST. JEOR
SCORE: 1478.66
SCORE: 1480.93

## 2019-02-19 ENCOUNTER — COMMUNICATION - HEALTHEAST (OUTPATIENT)
Dept: FAMILY MEDICINE | Facility: CLINIC | Age: 64
End: 2019-02-19

## 2019-02-19 DIAGNOSIS — R42 DIZZINESS: ICD-10-CM

## 2019-04-17 ENCOUNTER — DOCUMENTATION ONLY (OUTPATIENT)
Dept: CARE COORDINATION | Facility: CLINIC | Age: 64
End: 2019-04-17

## 2019-04-19 ENCOUNTER — RECORDS - HEALTHEAST (OUTPATIENT)
Dept: ADMINISTRATIVE | Facility: OTHER | Age: 64
End: 2019-04-19

## 2019-05-03 PROBLEM — R93.41 ABNORMAL CT SCAN, BLADDER: Status: ACTIVE | Noted: 2018-08-15

## 2019-05-03 PROBLEM — D63.8 CHRONIC DISEASE ANEMIA: Status: ACTIVE | Noted: 2018-08-14

## 2019-05-03 PROBLEM — F10.20 SEVERE ALCOHOL USE DISORDER (H): Status: ACTIVE | Noted: 2019-01-01

## 2019-05-03 PROBLEM — N17.9 ACUTE KIDNEY INJURY (H): Status: ACTIVE | Noted: 2018-08-14

## 2019-05-03 PROBLEM — E87.6 HYPOKALEMIA: Status: ACTIVE | Noted: 2019-04-09

## 2019-05-03 PROBLEM — E78.5 HYPERLIPEMIA: Status: ACTIVE | Noted: 2018-08-14

## 2019-05-03 PROBLEM — E11.9 TYPE 2 DIABETES MELLITUS (H): Status: ACTIVE | Noted: 2018-08-14

## 2019-05-03 PROBLEM — E86.1 HYPOTENSION DUE TO HYPOVOLEMIA: Status: ACTIVE | Noted: 2018-08-14

## 2019-05-03 PROBLEM — E87.1 HYPONATREMIA: Status: ACTIVE | Noted: 2018-08-15

## 2019-05-03 PROBLEM — H91.90 HEARING LOSS: Status: ACTIVE | Noted: 2019-01-01

## 2019-05-03 PROBLEM — J30.2 SEASONAL ALLERGIC RHINITIS: Status: ACTIVE | Noted: 2018-07-18

## 2019-05-03 NOTE — PROGRESS NOTES
HPI:       Yeyo Dejesus is a 63 year old  male with PMH significant for diabetes with related blindness, alcohol abuse, vertigo, HTN, and seasonal allergies  who presents for:      1. Follow up from [facility] 4/9/19 and \[facility]\"" care  - went to [place] 4/9/19 for weakness and was admitted with hypoglycemia, hypovolemia.   - was taking: glipizide and glimepride per chart review. glimepride was stopped and glipizide was decreased to 5 mg from 10 mg BID.   - per patient he is unsure exactly what he is taking except for metformin 500 mg in the AM and 1000 mg QHS  - has never been on insulin due to blindness  - blindness due to diabetes  - does not check blood sugars at home due to blindness  - typical meals: B: eggs, grits, sausage or cereal. L: sandwiches. D: meats, beans, veggies   - is not sure exactly what doses of medications he is taking   - thinks he has had weight loss because he has loss of appetite for the last few years after moving from his past house.but now better that he is in his own apartment   - home nurse comes once a week and sets up his medications   - says physically feels fine: no chest pain, sob, no more weakness, dizziness or lightheadedness  - last A1C on 4/9/19 is 5.6          PMHX:     Patient Active Problem List   Diagnosis     Abnormal CT scan, bladder     Acute kidney injury (H)     Alcohol abuse with alcohol-induced disorder (H)     Chronic disease anemia     Hearing loss     Hyperlipemia     Hypokalemia     Hyponatremia     Hypotension due to hypovolemia     Seasonal allergic rhinitis     Severe alcohol use disorder (H)     Substance induced mood disorder (H)     Type 2 diabetes mellitus (H)       Current Outpatient Medications   Medication Sig Dispense Refill     amLODIPine (NORVASC) 2.5 MG tablet Take 2.5 mg by mouth       atorvastatin (LIPITOR) 10 MG tablet Take 1 tablet (10 mg) by mouth at bedtime       cetirizine HCl (ZYRTEC ALLERGY) 10 MG CAPS 10 mg        glipiZIDE (GLUCOTROL) 5 MG tablet Take 2.5 mg by mouth       amLODIPine-atorvastatin (CADUET) 10-10 MG per tablet Take 1 tablet by mouth daily       aspirin 81 MG EC tablet Take 81 mg by mouth       ASPIRIN PO        DOCOSAHEXAENOIC ACID PO Take 1 g by mouth       GLIPIZIDE PO Take by mouth 2 times daily (before meals)       meclizine (ANTIVERT) 12.5 MG tablet   3     metFORMIN (GLUCOPHAGE) 500 MG tablet        metFORMIN (GLUCOPHAGE) 500 MG tablet 1,000 mg       multivitamin (THERMEMS) TABS Take 1 tablet by mouth       Omega-3 Fatty Acids (OMEGA-3 FISH OIL PO)        sildenafil (VIAGRA) 100 MG tablet Take 100 mg by mouth       vitamin C (ASCORBIC ACID) 1000 MG TABS Take 1,000 mg by mouth         Social History: smokes 5 cig/day, no alcohol or drug use since August 2018       No Known Allergies    No results found for this or any previous visit (from the past 24 hour(s)).         Review of Systems:   10 point ROS negative except noted in above in HPI         Physical Exam:     Vitals:    05/03/19 1414   BP: 147/62   Pulse: 74   Resp: 20   Temp: 97.8  F (36.6  C)   TempSrc: Oral   SpO2: 100%   Weight: 72.1 kg (159 lb)     There is no height or weight on file to calculate BMI.    GENERAL APPEARANCE: healthy, alert and no distress,  EYES: conjunctiva clear, does not track with eyes   RESP: lungs clear to auscultation - no rales, rhonchi or wheezes  CV: regular rate and rhythm,  and no murmur, click,  rub or gallop  ABDOMEN: soft, nontender, without hepatosplenomegaly or masses  MS: bilateral feet: DP pulses 2+, no open wounds, warm and well perfused, hair growth on bilateral great toes. Hyperkeratotic great toenails bilaterally   PSYCH: mood and affect normal/bright      Assessment and Plan     (E11.65,  Z79.4) Type 2 diabetes mellitus with hyperglycemia, with long-term current use of insulin (H)  (primary encounter diagnosis)  Comment: hospitalized 4/9-15/19 due to hypoglycemia and hypotension. Patient now establishing  care and follow up.  Patient unsure what exactly he is taking as far as medications at home -- a nurse sets up his medications weekly.   Plan: Basic Metabolic Panel ([facility]) - Results < 1 hr  - will check BMP today for baseline creatinine and electrolytes  - patient to follow up in the next few weeks with medications to review  - at that follow up will also discuss to see if he has had an eye appointment since he became totally blind    (Z13.9) Screening for condition  Comment: based on age will screen for Hep C, patient unsure if he has been screened in the past  Plan: Hepatitis C Antibody ([facility])  - will need follow up for complete physical to address other health maintenance       Options for treatment and follow-up care were reviewed with the patient and/or guardian. Yeyo Dejesus and/or guardian engaged in the decision making process and verbalized understanding of the options discussed and agreed with the final plan.      Lindsay Tavarez MD   PGY1 [facility] Resident   Pager: [phone]      Precepted today with: Chapincito Blank MD

## 2019-05-06 NOTE — PROGRESS NOTES
Attempted to reach patient to update on hypokalemia at 3.2. Unable to reach patient at both home or mobile phone number. Previously had been on K-Chlor replacement after hospitalization but no longer on medication list. Medication has been sent to patient's pharmacy. Patient already has appointment with me for follow up on 5/17/19    -Lindsay Tavarez MD   PGY1 [facility] Resident   Pager: [phone]

## 2019-05-10 NOTE — PROGRESS NOTES
Preceptor Attestation:   Patient seen, evaluated and discussed with the resident. I have verified the content of the note, which accurately reflects my assessment of the patient and the plan of care.    Supervising Physician:Chapincito Blank MD    [facility]

## 2020-01-01 ENCOUNTER — RECORDS - HEALTHEAST (OUTPATIENT)
Dept: ADMINISTRATIVE | Facility: OTHER | Age: 65
End: 2020-01-01

## 2020-01-01 ENCOUNTER — COMMUNICATION - HEALTHEAST (OUTPATIENT)
Dept: FAMILY MEDICINE | Facility: CLINIC | Age: 65
End: 2020-01-01

## 2020-01-01 ENCOUNTER — AMBULATORY - HEALTHEAST (OUTPATIENT)
Dept: EDUCATION SERVICES | Facility: CLINIC | Age: 65
End: 2020-01-01

## 2020-01-01 ENCOUNTER — COMMUNICATION - HEALTHEAST (OUTPATIENT)
Dept: ADMINISTRATIVE | Facility: CLINIC | Age: 65
End: 2020-01-01

## 2020-01-01 ENCOUNTER — RECORDS - HEALTHEAST (OUTPATIENT)
Dept: HEALTH INFORMATION MANAGEMENT | Facility: CLINIC | Age: 65
End: 2020-01-01

## 2020-01-01 DIAGNOSIS — J30.2 SEASONAL ALLERGIC RHINITIS: ICD-10-CM

## 2020-01-01 DIAGNOSIS — H35.52 PIGMENTARY RETINAL DYSTROPHY: ICD-10-CM

## 2020-01-01 DIAGNOSIS — E11.39 TYPE 2 DIABETES MELLITUS WITH OTHER OPHTHALMIC COMPLICATION, WITHOUT LONG-TERM CURRENT USE OF INSULIN (H): ICD-10-CM

## 2020-01-01 DIAGNOSIS — H54.7 VISION LOSS: ICD-10-CM

## 2020-01-01 DIAGNOSIS — E78.01 FAMILIAL HYPERCHOLESTEROLEMIA: ICD-10-CM

## 2020-01-01 LAB — RETINOPATHY: NEGATIVE

## 2021-05-28 ENCOUNTER — RECORDS - HEALTHEAST (OUTPATIENT)
Dept: ADMINISTRATIVE | Facility: CLINIC | Age: 66
End: 2021-05-28

## 2021-05-28 NOTE — TELEPHONE ENCOUNTER
Refill Request  Did you contact pharmacy: Yes  Medication name:   Requested Prescriptions     Pending Prescriptions Disp Refills     glipiZIDE (GLUCOTROL XL) 10 MG 24 hr tablet [Pharmacy Med Name: glipiZIDE ER Oral Tablet Extended Release 24 Hour 10 MG] 60 tablet 3     Sig: TAKE ONE TABLET BY MOUTH TWICE DAILY     pioglitazone (ACTOS) 45 MG tablet [Pharmacy Med Name: Pioglitazone HCl Oral Tablet 45 MG] 30 tablet 5     Sig: TAKE ONE TABLET BY MOUTH ONE TIME DAILY     Who prescribed the medication: PCP  Pharmacy Name and Location: [facility]  Is patient out of medication: Yes  Patient notified refills processed in 72 hours:  yes  Okay to leave a detailed message: no

## 2021-05-28 NOTE — TELEPHONE ENCOUNTER
He has no-showed the past several appointments and when I previously prescribed this it was not agreed that it would be renewed

## 2021-05-28 NOTE — TELEPHONE ENCOUNTER
Medication Request  Medication name   Glipizide and Metformin  Pharmacy Name and Location:  [facility]   Reason for request:  Instructions on the Glipizide are different on the bottle as they are on the discharge instructions,    Discharge instructions does not have the patient taking the Metformin  Please advise    Patient is asking that the message be left on the VM of the support services person Gerda as he is not able to set up medications  # is [phone]  When did you use medication last?:   n/a  Patient offered appointment:  n/a  Okay to leave a detailed message: yes

## 2021-05-28 NOTE — TELEPHONE ENCOUNTER
[facility] pharmacist calling requesting new prescriptions for Glipizide, amlodipine and ferrous gluconate prescribed during hospital stay. Patient waiting at pharmacy, call warm transferred to immediate clinical need line.      Kerwin Spears RN BSN, [facility] Triage/Med Refill

## 2021-05-28 NOTE — TELEPHONE ENCOUNTER
Incoming [facility] Call    Call : LEOLA Suresh  Caller's Name: [facility] Pharmacist  PCP: Buster Mccauley Provider:---  Rachelle #: [id number]  Dee Dee Call: ----    Message:  Pt at pharmacy now, call pick-up by Fidelina.

## 2021-05-28 NOTE — TELEPHONE ENCOUNTER
Called pt to schedule a follow up appt with DM, and depression. Pt was transferred to the .  Appt set for 6/7/19.

## 2021-05-28 NOTE — TELEPHONE ENCOUNTER
Called [facility] to relay meds being ordered and pt must keep apt on 5/9 as he no showed/cx apt yesterday for INF.  Thanks.

## 2021-05-28 NOTE — TELEPHONE ENCOUNTER
RN cannot approve Refill Request    RN can NOT refill this medication Protocol failed and NO refill given.       Marleen Mason, [facility] Triage/Med Refill 4/30/2019    Requested Prescriptions   Pending Prescriptions Disp Refills     glipiZIDE (GLUCOTROL XL) 10 MG 24 hr tablet [Pharmacy Med Name: glipiZIDE ER Oral Tablet Extended Release 24 Hour 10 MG] 60 tablet 5     Sig: TAKE ONE TABLET BY MOUTH TWICE DAILY       Oral Hypoglycemics Refill Protocol Failed - 4/30/2019 10:36 AM        Failed - Visit with PCP or prescribing provider visit in last 6 months       Last office visit with prescriber/PCP: Visit date not found OR same dept: 9/14/2018 Melina Goins MD OR same specialty: 9/14/2018 Melina Goins MD Last physical: Visit date not found Last MTM visit: Visit date not found         Next appt within 3 mo: Visit date not found  Next physical within 3 mo: Visit date not found  Prescriber OR PCP: Melina Goins MD  Last diagnosis associated with med order: 1. Type 2 diabetes mellitus with other ophthalmic complication, without long-term current use of insulin (H)  - glipiZIDE (GLUCOTROL XL) 10 MG 24 hr tablet [Pharmacy Med Name: glipiZIDE ER Oral Tablet Extended Release 24 Hour 10 MG]; TAKE ONE TABLET BY MOUTH TWICE DAILY   Dispense: 60 tablet; Refill: 3  - pioglitazone (ACTOS) 45 MG tablet [Pharmacy Med Name: Pioglitazone HCl Oral Tablet 45 MG]; TAKE ONE TABLET BY MOUTH ONE TIME DAILY   Dispense: 30 tablet; Refill: 5     If protocol passes may refill for 12 months if within 3 months of last provider visit (or a total of 15 months).           Failed - A1C in last 6 months     Hemoglobin A1c   Date Value Ref Range Status   08/20/2018 5.8 4.2 - 6.1 % Final               Failed - Microalbumin in last year      Microalbumin, Random Urine   Date Value Ref Range Status   07/18/2016 14.22 (H) 0.00 - 1.99 mg/dL Final                  Passed - Blood pressure in last year     BP Readings from Last 1  Encounters:   09/14/18 118/56             Passed - Serum creatinine in last year     Creatinine   Date Value Ref Range Status   09/06/2018 1.07 0.70 - 1.30 mg/dL Final             pioglitazone (ACTOS) 45 MG tablet [Pharmacy Med Name: Pioglitazone HCl Oral Tablet 45 MG] 30 tablet 5     Sig: TAKE ONE TABLET BY MOUTH ONE TIME DAILY       Pioglitazone Protocol Failed - 4/30/2019 10:36 AM        Failed - Visit with PCP or prescribing provider visit in last 6 months      Last office visit with prescriber/PCP: Visit date not found OR same dept: 9/14/2018 Melina Goins MD OR same specialty: 9/14/2018 Melina Goins MD Last physical: Visit date not found Last MTM visit: Visit date not found         Next appt within 3 mo: Visit date not found  Next physical within 3 mo: Visit date not found  Prescriber OR PCP: Melina Goins MD  Last diagnosis associated with med order: 1. Type 2 diabetes mellitus with other ophthalmic complication, without long-term current use of insulin (H)  - glipiZIDE (GLUCOTROL XL) 10 MG 24 hr tablet [Pharmacy Med Name: glipiZIDE ER Oral Tablet Extended Release 24 Hour 10 MG]; TAKE ONE TABLET BY MOUTH TWICE DAILY   Dispense: 60 tablet; Refill: 3  - pioglitazone (ACTOS) 45 MG tablet [Pharmacy Med Name: Pioglitazone HCl Oral Tablet 45 MG]; TAKE ONE TABLET BY MOUTH ONE TIME DAILY   Dispense: 30 tablet; Refill: 5     If protocol passes may refill for 12 months if within 3 months of last provider visit (or a total of 15 months).           Failed - A1C in last 6 months     Hemoglobin A1c   Date Value Ref Range Status   08/20/2018 5.8 4.2 - 6.1 % Final               Failed - Microalbumin in last year     Microalbumin, Random Urine   Date Value Ref Range Status   07/18/2016 14.22 (H) 0.00 - 1.99 mg/dL Final                  Passed - Blood pressure in last 12 months     BP Readings from Last 1 Encounters:   09/14/18 118/56             Passed - LFT or AST or ALT in last 12 months     Albumin    Date Value Ref Range Status   08/20/2018 3.5 3.5 - 5.0 g/dL Final     Bilirubin, Total   Date Value Ref Range Status   08/20/2018 0.4 0.0 - 1.0 mg/dL Final     Bilirubin, Direct   Date Value Ref Range Status   08/20/2018 0.2 <=0.5 mg/dL Final     Alkaline Phosphatase   Date Value Ref Range Status   08/20/2018 78 45 - 120 U/L Final     AST   Date Value Ref Range Status   08/20/2018 15 0 - 40 U/L Final     ALT   Date Value Ref Range Status   08/20/2018 9 0 - 45 U/L Final     Protein, Total   Date Value Ref Range Status   08/20/2018 7.0 6.0 - 8.0 g/dL Final                Passed - Serum creatinine in last year     Creatinine   Date Value Ref Range Status   09/06/2018 1.07 0.70 - 1.30 mg/dL Final

## 2021-05-28 NOTE — TELEPHONE ENCOUNTER
RN cannot approve Refill Request    RN can NOT refill this medication medication not on med list. Last office visit: 9/14/2018 Melina Goins MD Last Physical: Visit date not found Last MTM visit: Visit date not found Last visit same specialty: 9/14/2018 Melina Goins MD.  Next visit within 3 mo: Visit date not found  Next physical within 3 mo: Visit date not found      Debora Teran, [facility] Triage/Med Refill 5/4/2019    Requested Prescriptions   Pending Prescriptions Disp Refills     sildenafil (VIAGRA) 100 MG tablet [Pharmacy Med Name: Sildenafil Citrate Oral Tablet 100 MG] 2 tablet 0     Sig: Take 0.5 tablets (50 mg total) by mouth as needed for erectile dysfunction.       Medications for Impotence Refill Protocol Passed - 5/3/2019  6:05 PM        Passed - PCP or prescribing provider visit in last year     Last office visit with prescriber/PCP: 9/14/2018 Melina Goins MD OR same dept: 9/14/2018 Melina Goins MD OR same specialty: 9/14/2018 Melina Goins MD  Last physical: Visit date not found Last MTM visit: Visit date not found   Next visit within 3 mo: Visit date not found  Next physical within 3 mo: Visit date not found  Prescriber OR PCP: Melina Goins MD  Last diagnosis associated with med order: There are no diagnoses linked to this encounter.  If protocol passes may refill for 12 months if within 3 months of last provider visit (or a total of 15 months).

## 2021-05-28 NOTE — TELEPHONE ENCOUNTER
I believe he should still be on metformin, and it was missed on the discharge summary. 1000mg two times a day.  Please otherwise dose medications as instructed on the discharge summary.

## 2021-05-28 NOTE — TELEPHONE ENCOUNTER
[facility] pharmacy called to relay pt at pharmacy and needs new meds [facility] ordered.  Two faxes sent to [facility] on 4/18 and 4/26.  Pt was dc earlier in April.     Placed on desk meds that are highlighted to be ordered.  Any questions, please ask CMT.      Pt has INF to see PCP on 5/9 at 340 pm.  Thanks.   
Med list updated. New doses/meds ordered and a few d/c'd.  
no

## 2021-05-29 NOTE — TELEPHONE ENCOUNTER
Refill Approved    Rx renewed per Medication Renewal Policy. Medication was last renewed on 1/29/18.    Marleen Mason, [facility] Triage/Med Refill 6/10/2019     Requested Prescriptions   Pending Prescriptions Disp Refills     atorvastatin (LIPITOR) 10 MG tablet [Pharmacy Med Name: Atorvastatin Calcium Oral Tablet 10 MG] 90 tablet 1     Sig: TAKE ONE TABLET BY MOUTH AT BEDTIME       Statins Refill Protocol (Hmg CoA Reductase Inhibitors) Passed - 6/7/2019  8:23 PM        Passed - PCP or prescribing provider visit in past 12 months      Last office visit with prescriber/PCP: 9/14/2018 Melina Goins MD OR same dept: 9/14/2018 Melina Goins MD OR same specialty: 9/14/2018 Melina Goins MD  Last physical: Visit date not found Last MTM visit: Visit date not found   Next visit within 3 mo: Visit date not found  Next physical within 3 mo: Visit date not found  Prescriber OR PCP: Melina Goins MD  Last diagnosis associated with med order: 1. Hyperlipidemia  - atorvastatin (LIPITOR) 10 MG tablet [Pharmacy Med Name: Atorvastatin Calcium Oral Tablet 10 MG]; TAKE ONE TABLET BY MOUTH AT BEDTIME   Dispense: 90 tablet; Refill: 1    If protocol passes may refill for 12 months if within 3 months of last provider visit (or a total of 15 months).

## 2021-05-30 VITALS — BODY MASS INDEX: 22.08 KG/M2 | WEIGHT: 163 LBS | HEIGHT: 72 IN

## 2021-05-30 VITALS — BODY MASS INDEX: 23.45 KG/M2 | WEIGHT: 172.9 LBS

## 2021-05-31 VITALS — BODY MASS INDEX: 24.36 KG/M2 | HEIGHT: 71 IN | WEIGHT: 174 LBS

## 2021-05-31 VITALS — BODY MASS INDEX: 23.38 KG/M2 | WEIGHT: 167 LBS | HEIGHT: 71 IN

## 2021-05-31 VITALS — WEIGHT: 173 LBS | BODY MASS INDEX: 24.13 KG/M2

## 2021-05-31 VITALS — HEIGHT: 71 IN | BODY MASS INDEX: 24.36 KG/M2 | WEIGHT: 174 LBS

## 2021-05-31 VITALS — BODY MASS INDEX: 24.27 KG/M2 | WEIGHT: 174 LBS

## 2021-05-31 NOTE — PROGRESS NOTES
OFFICE VISIT NOTE      Assessment & Plan   Yeyo Dejesus is a 63 y.o. male who is blind, has diabetes and recently a relapse of his alcohol problem. He's hoping to remain abstinent from the alcohol and even hopes to move into a group home for others in his condition soon. This is the right priority for him.    Diabetes - resume/continue all diabetes meds.   HTN - continue same meds. Controlled.       Diagnoses and all orders for this visit:    Type 2 diabetes mellitus (H)  -     Glycosylated Hemoglobin A1c  -     Microalbumin, Random Urine    Routine general medical examination at a health care facility  -     HIV Antigen/Antibody Screening [place]        Melina Goins MD              Subjective:   Chief Complaint:  Diabetes    63 y.o. male.     1) moving next month to a house for people who are recovering from alcoholism  Current place of living is stressful - he's on the 13th floor. There's construction and remodeling. Water in the hallway. Deliveries of food come cold.    2) diabetes - back to taking his meds; he gets help with that.  3) toenails used to be trimmed by Dr. Olvera here at [place]. Dr. Olvera is no longer here - we'll need to help him find a new podiatrist as his toenails are long and hurting him.    Current Outpatient Medications   Medication Sig     ALL DAY ALLERGY, CETIRIZINE, 10 mg tablet TAKE ONE TABLET BY MOUTH ONE TIME DAILY      amLODIPine (NORVASC) 2.5 MG tablet Take 1 tablet (2.5 mg total) by mouth daily.     amLODIPine (NORVASC) 2.5 MG tablet Take 2.5 mg by mouth.     ascorbic acid, vitamin C, (VITAMIN C) 1000 MG tablet Take 1,000 mg by mouth daily as needed.     aspirin 81 MG EC tablet Take 81 mg by mouth daily.     atorvastatin (LIPITOR) 10 MG tablet TAKE ONE TABLET BY MOUTH AT BEDTIME      blood glucose test strips Use 1 each As Directed 2 (two) times a day. Dispense brand per patient's insurance at pharmacy discretion.     disulfiram (ANTABUSE) 250 mg tablet Take 1 tablet (250  "mg total) by mouth daily.     ferrous gluconate (FERGON) 324 MG tablet Take 1 tablet (324 mg total) by mouth daily with breakfast.     ferrous gluconate (FERGON) 324 MG tablet Take 324 mg by mouth.     folic acid (FOLVITE) 1 MG tablet TAKE 1 TABLET BY MOUTH DAILY     generic lancets Use 1 each As Directed 2 (two) times a day. Sig. Use to check blood glucose 1-2 times daily.     glipiZIDE (GLUCOTROL) 5 MG tablet Take 0.5 tablets (2.5 mg total) by mouth 2 (two) times a day before meals. 1/2 Hour BEFORE meals     glipiZIDE (GLUCOTROL) 5 MG tablet Take 2.5 mg by mouth.     KLOR-CON M20 20 mEq tablet      meclizine (ANTIVERT) 12.5 mg tablet TAKE ONE TABLET BY MOUTH THREE TIMES DAILY AS NEEDED FOR NAUSEA or dizziness     metFORMIN (GLUCOPHAGE) 1000 MG tablet Take 1 tablet (1,000 mg total) by mouth 2 (two) times a day with meals.     metFORMIN (GLUCOPHAGE) 500 MG tablet      multivitamin therapeutic (THERAGRAN) tablet Take 1 tablet by mouth daily.     nicotine (NICODERM CQ) 14 mg/24 hr Place 1 patch on the skin daily.     omega-3/dha/epa/fish oil (FISH OIL-OMEGA-3 FATTY ACIDS) 300-1,000 mg capsule Take 1 g by mouth daily.     sodium chloride 0.65 % Drop 1 spray into each nostril 4 (four) times a day.     tamsulosin (FLOMAX) 0.4 mg cap TAKE ONE CAPSULE BY MOUTH ONE TIME DAILY      thiamine 100 MG tablet Take 1 tablet (100 mg total) by mouth daily.       PSFHx: appropriate sections of PMH completed/filled in   Tobacco Status:  He  reports that he has been smoking cigarettes.  He has a 9.00 pack-year smoking history. He has never used smokeless tobacco.    Review of Systems:  No fever.  No rash. All other systems negative except as noted above.    Objective:    /60   Pulse 75   Temp 97.9  F (36.6  C) (Oral)   Resp 20   Ht 5' 11\" (1.803 m)   Wt 150 lb 4 oz (68.2 kg)   SpO2 98%   BMI 20.96 kg/m    GENERAL: No acute distress, not embarrassed by recent events - speaks plainly about them  Ht: reg s1s2  Lungs: " clear  Feet: 5/5 bilaterally on sensitivity to monofilament; toenails are all long, with the great toenails being very thick, too    With his verbal permission, I clipped all 10 of his toenails with a podiatry tool/clipper. None crumbled but the great toenails very very thick and difficult to trim; no bleeding or other complications.      I spent 25 min in consultation with Tree about DM, HTN, alcohol, living place, meds, etc. I spent additional time trimming his nails.

## 2021-05-31 NOTE — TELEPHONE ENCOUNTER
Overdue for diabetic eye exam.  No referral and no reports noted in chart.  Calling to inquire if patient requires ophthalmology referral pended.    Somewhat difficult to understand patient on the phone.  He stated he had an eye exam years ago; unknown location.    Will pend ophthalmology referral.    Reminded patient of 8/21/19 PCP office visit.

## 2021-06-01 ENCOUNTER — RECORDS - HEALTHEAST (OUTPATIENT)
Dept: ADMINISTRATIVE | Facility: CLINIC | Age: 66
End: 2021-06-01

## 2021-06-01 VITALS — HEIGHT: 70 IN | BODY MASS INDEX: 25.09 KG/M2 | WEIGHT: 175.25 LBS

## 2021-06-01 VITALS — HEIGHT: 70 IN | WEIGHT: 162.75 LBS | BODY MASS INDEX: 23.3 KG/M2

## 2021-06-01 VITALS — HEIGHT: 70 IN | WEIGHT: 170.25 LBS | BODY MASS INDEX: 24.37 KG/M2

## 2021-06-01 NOTE — TELEPHONE ENCOUNTER
5/1/19  Discontinued Medications      Reason for Discontinue   sildenafil (VIAGRA) 100 MG tablet Therapy completed     RN cannot approve Refill Request    RN can NOT refill this medication medication not on med list.       Marleen Mason, [facility] Triage/Med Refill 9/10/2019    Requested Prescriptions   Pending Prescriptions Disp Refills     sildenafil (VIAGRA) 100 MG tablet [Pharmacy Med Name: Sildenafil Citrate Oral Tablet 100 MG] 2 tablet 0     Sig: Take 0.5 tablets (50 mg total) by mouth as needed for erectile dysfunction.       Medications for Impotence Refill Protocol Passed - 9/9/2019  5:11 PM        Passed - PCP or prescribing provider visit in last year     Last office visit with prescriber/PCP: 8/21/2019 Melina Goins MD OR same dept: 8/21/2019 Melina Goins MD OR same specialty: 8/21/2019 Melina Goins MD  Last physical: Visit date not found Last MTM visit: Visit date not found   Next visit within 3 mo: Visit date not found  Next physical within 3 mo: Visit date not found  Prescriber OR PCP: Melina Goins MD  Last diagnosis associated with med order: There are no diagnoses linked to this encounter.  If protocol passes may refill for 12 months if within 3 months of last provider visit (or a total of 15 months).

## 2021-06-01 NOTE — TELEPHONE ENCOUNTER
Discontinued Medications      Reason for Discontinue   sildenafil (VIAGRA) 100 MG tablet Therapy completed     Marleen Mason RN [facility] Triage/Medication Refill

## 2021-06-02 VITALS — BODY MASS INDEX: 23.94 KG/M2 | HEIGHT: 71 IN | WEIGHT: 171 LBS

## 2021-06-02 NOTE — TELEPHONE ENCOUNTER
Pt missed his appt yesterday to f/u on diabetes. Please help pt schedule for after 11/21/19, because then it will actually be 3 months since his last DM f/u.

## 2021-06-03 VITALS — WEIGHT: 150.25 LBS | BODY MASS INDEX: 21.03 KG/M2 | HEIGHT: 71 IN

## 2021-06-03 NOTE — TELEPHONE ENCOUNTER
RN cannot approve Refill Request    RN can NOT refill this medication PCP messaged that patient is overdue for Labs. Last office visit: 8/21/2019 Melina Goins MD Last Physical: Visit date not found Last MTM visit: Visit date not found Last visit same specialty: 8/21/2019 Melina Goins MD.  Next visit within 3 mo: Visit date not found  Next physical within 3 mo: Visit date not found      Donal Bell, [facility] Triage/Med Refill 11/14/2019    Requested Prescriptions   Pending Prescriptions Disp Refills     metFORMIN (GLUCOPHAGE) 1000 MG tablet 60 tablet 0     Sig: Take 1 tablet (1,000 mg total) by mouth 2 (two) times a day with meals.       Metformin Refill Protocol Failed - 11/14/2019  7:43 AM        Failed - LFT or AST or ALT in last 12 months     Albumin   Date Value Ref Range Status   08/20/2018 3.5 3.5 - 5.0 g/dL Final     Bilirubin, Total   Date Value Ref Range Status   08/20/2018 0.4 0.0 - 1.0 mg/dL Final     Bilirubin, Direct   Date Value Ref Range Status   08/20/2018 0.2 <=0.5 mg/dL Final     Alkaline Phosphatase   Date Value Ref Range Status   08/20/2018 78 45 - 120 U/L Final     AST   Date Value Ref Range Status   08/20/2018 15 0 - 40 U/L Final     ALT   Date Value Ref Range Status   08/20/2018 9 0 - 45 U/L Final     Protein, Total   Date Value Ref Range Status   08/20/2018 7.0 6.0 - 8.0 g/dL Final                Failed - GFR or Serum Creatinine in last 6 months     GFR MDRD Non Af Amer   Date Value Ref Range Status   09/06/2018 >60 >60 mL/min/1.73m2 Final     GFR MDRD Af Amer   Date Value Ref Range Status   09/06/2018 >60 >60 mL/min/1.73m2 Final             Passed - Blood pressure in last 12 months     BP Readings from Last 1 Encounters:   08/21/19 120/60             Passed - Visit with PCP or prescribing provider visit in last 6 months or next 3 months     Last office visit with prescriber/PCP: 8/21/2019 OR same dept: 8/21/2019 Melina Goins MD OR same specialty:  8/21/2019 Melina Goins MD Last physical: Visit date not found Last MTM visit: Visit date not found         Next appt within 3 mo: Visit date not found  Next physical within 3 mo: Visit date not found  Prescriber OR PCP: Melina Goins MD  Last diagnosis associated with med order: 1. Type 2 diabetes mellitus with other ophthalmic complication, without long-term current use of insulin  - metFORMIN (GLUCOPHAGE) 1000 MG tablet; Take 1 tablet (1,000 mg total) by mouth 2 (two) times a day with meals.  Dispense: 60 tablet; Refill: 0     If protocol passes may refill for 12 months if within 3 months of last provider visit (or a total of 15 months).           Passed - A1C in last 6 months     Hemoglobin A1c   Date Value Ref Range Status   08/21/2019 5.8 3.5 - 6.0 % Final               Passed - Microalbumin in last year      Microalbumin, Random Urine   Date Value Ref Range Status   08/21/2019 3.16 (H) 0.00 - 1.99 mg/dL Final

## 2021-06-04 VITALS
BODY MASS INDEX: 21.1 KG/M2 | WEIGHT: 150.75 LBS | SYSTOLIC BLOOD PRESSURE: 132 MMHG | HEIGHT: 71 IN | TEMPERATURE: 98.3 F | RESPIRATION RATE: 22 BRPM | HEART RATE: 88 BPM | DIASTOLIC BLOOD PRESSURE: 80 MMHG

## 2021-06-04 NOTE — TELEPHONE ENCOUNTER
Please call [facility] and ask for his new address.  PCP wants to be sure we have the correct address on his chart.

## 2021-06-04 NOTE — TELEPHONE ENCOUNTER
RN cannot approve Refill Request    RN can NOT refill this medication med is not covered by policy/route to provider. Last office visit: 8/21/2019 Melina Goins MD Last Physical: Visit date not found Last MTM visit: Visit date not found Last visit same specialty: 8/21/2019 Melina Goins MD.  Next visit within 3 mo: Visit date not found  Next physical within 3 mo: Visit date not found      Iqra Stacy, [facility] Triage/Med Refill 12/6/2019    Requested Prescriptions   Pending Prescriptions Disp Refills     folic acid (FOLVITE) 1 MG tablet [Pharmacy Med Name: Folic Acid Oral Tablet 1 MG] 90 tablet 2     Sig: TAKE ONE TABLET BY MOUTH ONE TIME DAILY       There is no refill protocol information for this order

## 2021-06-04 NOTE — PROGRESS NOTES
OFFICE VISIT NOTE      Assessment & Plan   Yeyo Dejesus is a 63 y.o. male who is blind, has diabetes, high cholesterol, adv directive and more.  Without drinking, his diabetes is well-controlled.  Since he's already blind, I don't think the diabetic eye exam is very important, however, I'll refer him just to be certain.  Adv directive discussion was had.  Given urinary symptoms - will check PSA, as he is at high risk for prostate cancer.    Diagnoses and all orders for this visit:    Type 2 diabetes mellitus with other ophthalmic complication, without long-term current use of insulin (H)  -     Glycosylated Hemoglobin A1c  -     HM1(CBC and Differential)  -     Lipid Cascade RANDOM  -     Comprehensive Metabolic Panel  -     Ambulatory referral to Ophthalmology  -     HM1 (CBC with Diff)  -     Ambulatory referral to Diabetes Education Program (CDE)    Familial hypercholesterolemia  -     Lipid Cascade RANDOM  -     Ambulatory referral to Diabetes Education Program (CDE)    Vision loss  -     Ambulatory referral to Ophthalmology  -     Ambulatory referral to Diabetes Education Program (CDE)    Retinitis Pigmentosa- BLIND  -     Ambulatory referral to Ophthalmology  -     Ambulatory referral to Diabetes Education Program (CDE)    Erectile dysfunction due to diseases classified elsewhere  -     sildenafil (VIAGRA) 100 MG tablet; Take 1 tablet (100 mg total) by mouth as needed for erectile dysfunction.  Dispense: 6 tablet; Refill: 2    Need for immunization against influenza  -     Cancel: Influenza High Dose, Seasonal 65+ yrs  -     Influenza High Dose, Seasonal 65+ yrs    Advanced directives, counseling/discussion  Comments:  Mi March- SSP (special ) [phone]- sees her once per week on Tuesdays; older sister Hilaria Jain [phone]        Melina Goins MD              Subjective:   Chief Complaint:  Follow Up    63 y.o. male.     1) had a cold a month ago    2) last eye exam 6-7  years ago - he was told there was nothing that could be done    3) with winter, he does not go out as much  Watches TV, does OK with mood    4) gave up drinking a year ago, feels he has a lot of support; [employer] helps him get to AA meetings    5) problems urinating - going often! Poop - harder to get out, it is liquidy. Is a bit sore on his bottom, but not terrible.    6) eating is OK. Considering getting a stationary bike.    7) is at a new address, on [facility]. Feels safer there.    8) would want CPR; who to contact? Mi March- SSP (special ) [phone]- sees her once per week on Tuesdays; older sister Hilaria Jain [phone]    Current Outpatient Medications   Medication Sig     ALL DAY ALLERGY, CETIRIZINE, 10 mg tablet TAKE ONE TABLET BY MOUTH ONE TIME DAILY      amLODIPine (NORVASC) 2.5 MG tablet Take 1 tablet (2.5 mg total) by mouth daily.     amLODIPine (NORVASC) 2.5 MG tablet Take 2.5 mg by mouth.     ascorbic acid, vitamin C, (VITAMIN C) 1000 MG tablet Take 1,000 mg by mouth daily as needed.     aspirin 81 MG EC tablet Take 81 mg by mouth daily.     atorvastatin (LIPITOR) 10 MG tablet TAKE ONE TABLET BY MOUTH AT BEDTIME      blood glucose test strips Use 1 each As Directed 2 (two) times a day. Dispense brand per patient's insurance at pharmacy discretion.     disulfiram (ANTABUSE) 250 mg tablet Take 1 tablet (250 mg total) by mouth daily.     ferrous gluconate (FERGON) 324 MG tablet Take 1 tablet (324 mg total) by mouth daily with breakfast.     ferrous gluconate (FERGON) 324 MG tablet Take 324 mg by mouth.     folic acid (FOLVITE) 1 MG tablet TAKE ONE TABLET BY MOUTH ONE TIME DAILY      generic lancets Use 1 each As Directed 2 (two) times a day. Sig. Use to check blood glucose 1-2 times daily.     glipiZIDE (GLUCOTROL) 5 MG tablet Take 0.5 tablets (2.5 mg total) by mouth 2 (two) times a day before meals. 1/2 Hour BEFORE meals     glipiZIDE (GLUCOTROL) 5 MG tablet Take  "2.5 mg by mouth.     KLOR-CON M20 20 mEq tablet      meclizine (ANTIVERT) 12.5 mg tablet TAKE ONE TABLET BY MOUTH THREE TIMES DAILY AS NEEDED FOR NAUSEA or dizziness     metFORMIN (GLUCOPHAGE) 1000 MG tablet Take 1 tablet (1,000 mg total) by mouth 2 (two) times a day with meals.     metFORMIN (GLUCOPHAGE) 500 MG tablet      multivitamin therapeutic (THERAGRAN) tablet Take 1 tablet by mouth daily.     nicotine (NICODERM CQ) 14 mg/24 hr Place 1 patch on the skin daily.     omega-3/dha/epa/fish oil (FISH OIL-OMEGA-3 FATTY ACIDS) 300-1,000 mg capsule Take 1 g by mouth daily.     sildenafil (VIAGRA) 100 MG tablet Take 1 tablet (100 mg total) by mouth as needed for erectile dysfunction.     sodium chloride 0.65 % Drop 1 spray into each nostril 4 (four) times a day.     tamsulosin (FLOMAX) 0.4 mg cap TAKE ONE CAPSULE BY MOUTH ONE TIME DAILY      terbinafine HCl (LAMISIL) 1 % cream Apply to both feet over the bottom twice daily     thiamine 100 MG tablet Take 1 tablet (100 mg total) by mouth daily.       PSFHx: appropriate sections of PMH completed/filled in   Tobacco Status:  He  reports that he has been smoking cigarettes. He has a 9.00 pack-year smoking history. He has never used smokeless tobacco.    Review of Systems:  No fever.  Dry skin. All other systems negative except as noted above.    Objective:    /80   Pulse 88   Temp 98.3  F (36.8  C) (Oral)   Resp 22   Ht 5' 11\" (1.803 m)   Wt 150 lb 12 oz (68.4 kg)   BMI 21.03 kg/m    GENERAL: No acute distress, nicely dressed  Ht: reg s1s2  Lungs: clear with good aeration  Skin: no rashes, but dry all over; feet have no sores, toenails are a bit long and angled in parts, no calluses    With his verbal permission I trimmed both great toenails, and 3 other thick, long nails, using a large clipper    A1-C 5.6    25 min spent on diabetes, HTN, cholesterol and skin care. In addition to that, I clipped 5 toenails.  "

## 2021-06-05 NOTE — PROGRESS NOTES
Assessment/plan: pt here for DSME/T. Had some prior diabetes education but states that he does not remember much.  Diabetes is well controlled with metformin, lifestyle modification and some significant wt loss. A1C: 5.6 (12/20)  Reports polyuria but denies any polydipsia. No other concerns.    Has Retinitis Pigmentosa and is blind. Lives by himself, has some family in [place]. Has a SSP (Special ) that comes 1x/wk to help out with misc tasks and also helps with setting up his pillbox. He uses [employer] to get around.     - Brings in his vials of his meds. He is taking Metformin 1000 mg once/d (Vs the prescribed  two times a day), 2.5 mg Glipizide two times a day 30 mins before meals. Reports mild diarrhea initially but that has resolved now.     - Denies any sx of lows for the past 4 -5 months now, especially ever since he quit drinking alcohol. We reviewed sx and tx of hypo and hyperglycemia: pt verbalized understanding.     - Shares that he has had success with losing a significant amount of wt the past couple years and that he continues to maintain it. 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 Goes to AA 3 - 4x/wk and states that helps keep his mind off it.     - Eats 3 meals/d and meals consist of: burger, sandwich, beans. Always uses a crock pot to cook his meals 3-4x/wk, usually cooks at least a couple meals at a time. Knows high CHO foods and tries to stay away from them. Denies any sugary beverages/desserts    - does some resistance exercises like: push -ups, sit - ups and leg lifts and is looking into buying an exercise bike. Doesn't go out much, states warmer weather might help.    ++ Pt will continue to be mindful of his intake, stay abstinent from the alcohol and f/u as needed. Agreeable to call with 2 more episodes of sx of low BS or 3 high BS in a week.        Subjective and Objective:      Yeyo Dejesus is referred by Dr. Goins for Diabetes Education.      Lab Results   Component Value Date    HGBA1C 5.6 12/20/2019     Goals:   Watch high CHO foods  Take meds as preescribed  F/u as needed      Education:     Monitoring   Meter (per above goals): Assessed  Monitoring: Assessed  BG goals: Assessed and Discussed    Nutrition Management  Nutrition Management: Assessed  Weight: Assessed and Discussed  Portions/Balance: Assessed and Discussed  Carb ID/Count: Assessed and Discussed  Label Reading: Assessed  Heart Healthy Fats: Assessed  Menu Planning: Assessed  Dining Out: Assessed  Physical Activity: Assessed and Discussed  Medications: Assessed and Discussed  Orals: Assessed and Discussed  Injected Medications: Assessed       Diabetes Disease Process: Assessed and Discussed    Acute Complications: Prevent, Detect, Treat:  Hypoglycemia: Assessed and Discussed  Hyperglycemia: Assessed and Discussed  Sick Days: Assessed  Driving: Assessed    Chronic Complications  Foot Care:Assessed  Skin Care: Assessed  Eye: Assessed  ABC: Assessed and Discussed  Teeth:Assessed  Goal Setting and Problem Solving: Assessed and Discussed  Barriers: Assessed and Discussed  Psychosocial Adjustments: Assessed      Time spent with the patient: 60 minutes for diabetes education and counseling.   Previous Education: yes  Visit Type:CARISSA Laureano  2/5/2020

## 2021-06-06 NOTE — TELEPHONE ENCOUNTER
Refill Approved    Rx renewed per Medication Renewal Policy. Medication was last renewed on 7/18/18.    Marleen Mason, [facility] Triage/Med Refill 3/3/2020     Requested Prescriptions   Pending Prescriptions Disp Refills     ALL DAY ALLERGY, CETIRIZINE, 10 mg tablet [Pharmacy Med Name: EQL All Day Allergy Oral Tablet 10 MG] 90 tablet 1     Sig: TAKE ONE TABLET BY MOUTH ONE TIME DAILY       Antihistamine Refill Protocol Passed - 3/2/2020 10:26 AM        Passed - Patient has had office visit/physical in last year     Last office visit with prescriber/PCP: 12/20/2019 Melina Goins MD OR same dept: 12/20/2019 Melina Goins MD OR same specialty: 12/20/2019 Melina Goins MD  Last physical: Visit date not found Last MTM visit: Visit date not found   Next visit within 3 mo: Visit date not found  Next physical within 3 mo: Visit date not found  Prescriber OR PCP: Melina Goins MD  Last diagnosis associated with med order: 1. Seasonal allergic rhinitis  - ALL DAY ALLERGY, CETIRIZINE, 10 mg tablet [Pharmacy Med Name: EQL All Day Allergy Oral Tablet 10 MG]; TAKE ONE TABLET BY MOUTH ONE TIME DAILY   Dispense: 90 tablet; Refill: 1    If protocol passes may refill for 12 months if within 3 months of last provider visit (or a total of 15 months).

## 2021-06-08 NOTE — PROGRESS NOTES
ASSESSMENT:    Type 2 diabetes, degree of control to be determined.    PLAN:  Check a hemoglobin A1c.  I will call him with results and further recommendations.  Problem List Items Addressed This Visit     None          There are no discontinued medications.    No Follow-up on file.    There are no Patient Instructions on file for this visit.    CHIEF COMPLAINT:  Chief Complaint   Patient presents with     Follow-up     f/u on dm       HISTORY OF PRESENT ILLNESS:  Yeyo Dejesus is a 61 y.o. male, a patient of Dr. Chand, presenting to the clinic today for follow up for his diabetes. His hemoglobin A1C was last monitored at 8.5 on 10/25/16. He consulted with Ricardo Chadwick RN in Diabetes Education on 12/5/16, who recommended that he begin Actos 30 mg daily in addition to continuing glipizide 10 mg BID. He is currently taking 10 mg of glipizide BID, 750 mg of metformin BID, and 30 mg of Actos daily. He does not monitor his blood glucose values on a regular basis, as he is blind and has mechanical difficulty checking his sugars. He was referred to Home Health Care by Ricardo during his Diabetes Education consultation to aid his sugar monitoring. He consulted with Home Health Care on 12/7/16 but was discharged from the service at that time, as he was uncompliant with the nurse and was thought to be intoxicated. He is due to receive an updated hemoglobin A1C at this time.     REVIEW OF SYSTEMS:   He denies any chest pain. He has transient numbness in his feet bilaterally. He denies foot numbness at this time. He occasionally develops a pain in his right hip, which extends to his right knee. He occasionally develops an itching in the left aspect of his chest. All other systems are negative.    PFSH:  No Known Allergies    TOBACCO USE:  History   Smoking Status     Current Every Day Smoker     Packs/day: 0.50     Years: 30.00   Smokeless Tobacco     Never Used       VITALS:  Vitals:    01/18/17 0846   BP: 110/76   Patient Site:  Right Arm   Patient Position: Sitting   Cuff Size: Adult Regular   Pulse: 76   Weight: 172 lb 14.4 oz (78.4 kg)     Wt Readings from Last 3 Encounters:   01/18/17 172 lb 14.4 oz (78.4 kg)   10/25/16 172 lb 12.8 oz (78.4 kg)   07/18/16 172 lb (78 kg)         PHYSICAL EXAM:  Constitutional:  Reveals an alert and oriented x3, pleasant male with no acute distress.       ADDITIONAL HISTORY SUMMARIZED (FROM OLD RECORDS OR HISTORY FROM SOMEONE OTHER THAN THE PATIENT OR ANOTHER HEALTHCARE PROVIDER), COLONOSCOPY (2 TOTAL): Reviewed notes from Dr. Gamboa and Ricardo Chadwick RN on 11/30/16 and 12/5/16, respectively, regarding diabetes.     DECISION TO OBTAIN EXTRA INFORMATION (OLD RECORDS REQUESTED OR HISTORY FROM ANOTHER PERSON OR ACCESSING CARE EVERYWHERE) (1 TOTAL):none    RADIOLOGY TESTS SUMMARIZED OR ORDERED (XRAY/CT/MRI/DXA/MAMMO) (1 TOTAL): none    LABS REVIEWED OR ORDERED (1 TOTAL): Labs ordered and reviewed.    MEDICINE TESTS SUMMARIZED OR ORDERED (EKG/ECHO/EGD) (1 TOTAL): none    INDEPENDENT REVIEW OF EKG OR X-RAY (2 EACH): none      The visit lasted a total of 8 minutes face to face with the patient. Over 50% of the time was spent counseling and educating the patient about diabetes.    I, Kris Scott, am scribing for and in the presence of, Dr. Link Vieyra.    I, Dr. Link Vieyra, personally performed the services described in this documentation, as scribed by Kris Scott in my presence, and it is both accurate and complete.    MEDICATIONS:  Current Outpatient Prescriptions   Medication Sig Dispense Refill     aspirin 81 MG EC tablet Take 81 mg by mouth daily.       atorvastatin (LIPITOR) 10 MG tablet TAKE 1 TABLET BY MOUTH AT BEDTIME 90 tablet 1     blood glucose test strips Use 1 each As Directed 2 (two) times a day. Dispense brand per patient's insurance at pharmacy discretion. 200 each 3     folic acid (FOLVITE) 1 MG tablet TAKE 1 TABLET BY MOUTH DAILY 90 tablet 1     generic lancets Use 1 each As Directed 2  (two) times a day. Sig. Use to check blood glucose 1-2 times daily. 200 each 3     glipiZIDE (GLUCOTROL) 10 MG 24 hr tablet Take 1 tablet (10 mg total) by mouth 2 (two) times a day. 60 tablet 4     metFORMIN (GLUCOPHAGE-XR) 750 MG 24 hr tablet Take 1 tablet (750 mg total) by mouth 2 (two) times a day. 60 tablet 4     multivitamin therapeutic (THERAGRAN) tablet Take 1 tablet by mouth daily.       pioglitazone (ACTOS) 30 MG tablet Take 1 tablet (30 mg total) by mouth daily. 30 tablet 6     sitaGLIPtin (JANUVIA) 100 MG tablet Take 1 tablet (100 mg total) by mouth daily. With or without food 30 tablet 3     thiamine 100 MG tablet Take 1 tablet (100 mg total) by mouth daily.       No current facility-administered medications for this visit.        Total data points: 3

## 2021-06-08 NOTE — PROGRESS NOTES
"Assessment: Tree is a pleasant male who comes in today for some help with his diabetes management.  He used to be a patient of Dr. Lewis, but hasn't been to see her in 7 months.  He has seen Cooper Larson, & SERGIO Vieyra since that time, but they are not taking new patients.  At the end of our appointment we scheduled him with Josiane Luong NP who will see him at the end of February for a new patient visit.  He has a complicated background of being blind and living at his home alone.  When speaking to Dr. Vieyra, he stated that he used to have home health care, but apparently Tree was really drunk one day and became too aggressive for home health care to deal with so they haven't seen him for a while.  He sometimes has help every week setting up his pills & every month on the 1st he has a \"\" who helps him  his medications and get things organized.  I believe that  is just a friend or volunteer at his [employer].  I have spoken over the phone with Tree in the past 2 months when he was drunk & mumbling and he was very hard to understand.  When he is not drinking he has been very pleasant however.  Today he stated he has not drank alcohol since November.  I questioned him on this telling him I heard him over the phone most likely intoxicated.  He denied it & stated he has been frustrated with his health insurance & trusting people who come over to his house to help him.  Today I tried to go over with him testing his blood sugar on his own.  Although he is getting better at it, it is very difficult for him to squeeze out a proper drop of blood and get the test strip to receive the sample.  We went over testing his blood sugar twice and he needs someone there to help him get the proper blood test.  He does know how to put the test strip in the meter and use the lancing device however.  His blood sugar at our meeting today was 255 & 273.  He stated he had a polish sausage with a slice " of bread for breakfast 2 hours before.  It was also found he had a hot chocolate just prior to our appointment which may have increased his blood sugar.  He stated his last appointment with Dr. Vieyra he was told to take 3 metformin daily.  I wrote this on his pills in case someone comes to visit and helps him.  He has 3 full bottles of metformin XR 750mg and 2 bottles of glipizide.  We went over the shape and feel of each and when to take them.  He will take 1 glipizide and 1 metformin before breakfast & 1 glipizide and 2 metformin before dinner.    Plan: He has an appointment in 1 month to re-establish care at [facility] with Josiane Luong.  It would be beneficial for him to move to a place that has home care or to have home care nursing visits, but it appears he needs to demonstrate proper behavior & stop alcohol use to prevent future altercations.  I would start him on insulin, but there is no home care to help him administer it.  He was given an Rx for januvia & actos in the past, but has not picked it up.  He states he does not have enough money for it sometimes.  It would be beneficial for someone else to take care of this as he gets frustrated, has a short fuse, & gives up on some things.  He will continue his use of metformin and glipizide daily.  He will follow up with me in 3 months.    Subjective and Objective:      Yeyo Dejesus is referred by Hi Chand for Diabetes Education.     Lab Results   Component Value Date    HGBA1C 7.8 (H) 01/18/2017         Current diabetes medications:  Metformin ER 1500mg, glipizide 10mg BID    Goals       Monitoring            New Goal 01.29.16:  Work on using meter to test blood sugar once daily            Follow up:   Primary care visit  CDE (certified diabetic educator)      Education:     Monitoring   Meter (per above goals): Assessed and Discussed  Monitoring: Assessed and Discussed  BG goals: Assessed and Discussed    Nutrition Management  Nutrition  Management: Assessed and Discussed  Weight: Assessed and Discussed  Portions/Balance: Assessed and Discussed  Carb ID/Count: Not addressed  Label Reading: Not addressed  Heart Healthy Fats: Not addressed  Menu Planning: Not addressed  Dining Out: Not addressed  Physical Activity: Assessed and Discussed  Medications: Assessed and Discussed  Orals: Assessed and Discussed  Injected Medications: Assessed and Discussed   Storage/Exp:Not addressed   Site Rotation: Not addressed   Sites Assessed: no    Diabetes Disease Process: Assessed, Discussed and Literature provided    Acute Complications: Prevent, Detect, Treat:  Hypoglycemia: Assessed and Discussed  Hyperglycemia: Assessed and Discussed  Sick Days: Not addressed  Driving: Not addressed    Chronic Complications  Foot Care:Assessed and Discussed  Skin Care: Not addressed  Eye: Assessed and Discussed  ABC: Assessed and Discussed  Teeth:Not addressed  Goal Setting and Problem Solving: Assessed and Discussed  Barriers: Assessed and Discussed  Psychosocial Adjustments: Assessed and Discussed      Time spent with the patient: 60 minutes for diabetes education and counseling.   Previous Education: yes  Visit Type:DSMT  Hours Remaining: DSMT 1 and MNT 2      Pito Chadwick  1/30/2017

## 2021-06-10 NOTE — PROGRESS NOTES
Assessment: Tree is a pleasant mariah who is blind coming in for a follow up on his diabetes management.  He had his a1c drawn today which was 7 and an improvement from 7.8.  This may also be due to him losing weight.  He was actually disappointed and frustrated about losing weight and claims it is harder to balance now.  I stated it can actually help diabetes control by losing weight.  He has not picked up actos or januvia from his pharmacy yet.  He has trouble affording things so januvia may not be a good medication option.  I told him to  his actos 30mg at his next visit to the pharmacy to take in addition to 3 metformin xr 750mg tablets & 2 10mg glipizide tabs.  He has someone help him once a week put his pills together.  He cannot test his blood sugar on his own and needs assistance with many things, but no family or friend lives with him.  He states he moved places to get away from the liquor store and so it was a bit cheaper.  He is up on the 16th floor and has a troublesome time getting up and down the elevator as well as finding his apartment.  He states he no longer drinks alcohol which I think was an issue with his home care in the past.  I would like to establish home care as well as a primary at the [facility] due to their specialty in financial, housing, and .  Tree would benefit from having a care manager or someone to check his blood sugars and get his medication together correctly.  We discussed cutting down his juice intake and having more water instead.  He says he has drastically cut down on his soda intake which may be improving his blood sugars as well.  He states that his feet are feeling better and he has pain and tingling before with them.    Plan: Continue metformin xR 2250mg, glipizide 10mg BID, & start actos 30mg daily.  Establish a primary provider at the [facility] and obtain homecare services to help him check blood sugars, take medications, & orient around  his house/apartment building.  He would like to be able to go to [employer], but can't navigate his building well as it's a place he just moved to.    Subjective and Objective:      Yeyo Dejesus is referred by Hi Chand for Diabetes Education.     Lab Results   Component Value Date    HGBA1C 7.8 (H) 01/18/2017         Current diabetes medications:  Metformin XR 2250mg, glipizide 10mg BID    Goals       Monitoring            New Goal 01.29.16:  Work on using meter to test blood sugar once daily            Follow up:   Primary care visit  CDE (certified diabetic educator)      Education:     Monitoring   Meter (per above goals): Assessed and Discussed  Monitoring: Assessed and Discussed  BG goals: Assessed and Discussed    Nutrition Management  Nutrition Management: Assessed and Discussed  Weight: Assessed and Discussed  Portions/Balance: Assessed and Discussed  Carb ID/Count: Discussed  Label Reading: Not addressed  Heart Healthy Fats: Not addressed  Menu Planning: Not addressed  Dining Out: Not addressed  Physical Activity: Assessed and Discussed  Medications: Assessed and Discussed  Orals: Assessed and Discussed  Injected Medications: Not addressed   Storage/Exp:Not addressed   Site Rotation: Not addressed   Sites Assessed: no    Diabetes Disease Process: Assessed and Discussed    Acute Complications: Prevent, Detect, Treat:  Hypoglycemia: Assessed and Discussed  Hyperglycemia: Assessed and Discussed  Sick Days: Not addressed  Driving: Not addressed    Chronic Complications  Foot Care:Assessed and Discussed  Skin Care: Not addressed  Eye: Not addressed  ABC: Assessed and Discussed  Teeth:Not addressed  Goal Setting and Problem Solving: Assessed and Discussed  Barriers: Assessed and Discussed  Psychosocial Adjustments: Assessed and Discussed      Time spent with the patient: 60 minutes for diabetes education and counseling.   Previous Education: yes  Visit Type:DSMT  Hours Remaining: DSMT 1 and MNT  2      Pito Chadwick  4/24/2017

## 2021-06-10 NOTE — PROGRESS NOTES
Spoke w/ pt, made appt for 6/14 w/ Dr. Goins. Please update the pharmacy on chart to [facility] on [facility].     Address is: [address] [place] [address]  Phone: [phone]

## 2021-06-10 NOTE — PROGRESS NOTES
Pt contacted (he is blind ) would like to establish care with a female provider.  Please call back after 9 am; and his PCA will be there to assist in scheduling.

## 2021-06-10 NOTE — PROGRESS NOTES
[facility] Clinic Note  Patient Name: Yeyo Dejesus  Patient Age: 61 y.o.  YOB: 1955  MRN: [id number]  ?  Date of Visit: 4/24/2017  Reason for Office Visit:   Chief Complaint   Patient presents with     Diabetes     med Check          HPI: Yeyo Dejesus 61 y.o. male with a history of retinitis pigmentosa (blind), T2DM, HLD who presents to clinic for diabetes follow up.     He recently moved into a new apartment.     He is currently taking glipizide, metformin, actos and januvia. He takes them in the morning and glipizide twice a day. He does not check his blood sugars due to his eye sight. Most recent A1c 3 mo ago 7.8 (H)    He has lost about 9 Ibs over the course of 3 months     He stopped drinking 'months ago'. Still smokes about 3-4 cigarettes per day     No tingling/numbess in hands or feet. No polyuria/polydipsia . No chest pain, palpitations, GI symptoms, headaches/dizziness.     Review of Systems: As noted in HPI     Current Scheduled Meds:  Outpatient Encounter Prescriptions as of 4/24/2017   Medication Sig Dispense Refill     aspirin 81 MG EC tablet Take 81 mg by mouth daily.       atorvastatin (LIPITOR) 10 MG tablet TAKE 1 TABLET BY MOUTH AT BEDTIME 90 tablet 1     blood glucose test strips Use 1 each As Directed 2 (two) times a day. Dispense brand per patient's insurance at pharmacy discretion. 200 each 3     folic acid (FOLVITE) 1 MG tablet TAKE 1 TABLET BY MOUTH DAILY 90 tablet 1     generic lancets Use 1 each As Directed 2 (two) times a day. Sig. Use to check blood glucose 1-2 times daily. 200 each 3     glipiZIDE (GLUCOTROL) 10 MG 24 hr tablet Take 1 tablet (10 mg total) by mouth 2 (two) times a day. 60 tablet 4     metFORMIN (GLUCOPHAGE-XR) 750 MG 24 hr tablet Take 1 tablet (750 mg total) by mouth 3 (three) times a day before meals. 90 tablet 4     multivitamin therapeutic (THERAGRAN) tablet Take 1 tablet by mouth daily.       pioglitazone (ACTOS) 30 MG tablet Take 1  tablet (30 mg total) by mouth daily. 30 tablet 6     sitaGLIPtin (JANUVIA) 100 MG tablet Take 1 tablet (100 mg total) by mouth daily. With or without food 30 tablet 3     thiamine 100 MG tablet Take 1 tablet (100 mg total) by mouth daily.       [DISCONTINUED] glipiZIDE (GLUCOTROL) 10 MG 24 hr tablet Take 1 tablet (10 mg total) by mouth 2 (two) times a day. 60 tablet 4     [DISCONTINUED] metFORMIN (GLUCOPHAGE-XR) 750 MG 24 hr tablet Take 1 tablet (750 mg total) by mouth 3 (three) times a day before meals. 90 tablet 4     [DISCONTINUED] pioglitazone (ACTOS) 30 MG tablet Take 1 tablet (30 mg total) by mouth daily. 30 tablet 6     [DISCONTINUED] sitaGLIPtin (JANUVIA) 100 MG tablet Take 1 tablet (100 mg total) by mouth daily. With or without food 30 tablet 3     No facility-administered encounter medications on file as of 4/24/2017.        Objective / Physical Examination:  /68  Pulse 72  Ht 6' (1.829 m)  Wt 163 lb (73.9 kg)  BMI 22.11 kg/m2  Wt Readings from Last 3 Encounters:   04/24/17 163 lb (73.9 kg)   01/18/17 172 lb 14.4 oz (78.4 kg)   10/25/16 172 lb 12.8 oz (78.4 kg)     Body mass index is 22.11 kg/(m^2). (>25?)    General Appearance:  male, in no acute distress  Lungs: Clear to auscultation bilaterally. Normal inspiratory and expiratory effort  Cardiovascular: RRR S1, S2. No m/r/g  Extremities:  No edema.  Integumentary: Warm and dry.   Neuro: Alert and oriented, follows commands appropriately. Gait normal     Assessment / Plan / Medical Decision Making:      Encounter Diagnoses   Name Primary?     Type 2 diabetes mellitus with other ophthalmic complication, without long-term current use of insulin Yes     Weight loss      Type 2 diabetes mellitus without complication, without long-term current use of insulin         1. Type 2 diabetes mellitus with other ophthalmic complication, without long-term current use of insulin    Check A1c today  Renal and lytes  Refilled medication   Has  diab educator appt this morning as well   Encouraged healthy diet. Stay active      - Glycosylated Hemoglobin A1c  - Basic Metabolic Panel  - glipiZIDE (GLUCOTROL) 10 MG 24 hr tablet; Take 1 tablet (10 mg total) by mouth 2 (two) times a day.  Dispense: 60 tablet; Refill: 4  - metFORMIN (GLUCOPHAGE-XR) 750 MG 24 hr tablet; Take 1 tablet (750 mg total) by mouth 3 (three) times a day before meals.  Dispense: 90 tablet; Refill: 4  - pioglitazone (ACTOS) 30 MG tablet; Take 1 tablet (30 mg total) by mouth daily.  Dispense: 30 tablet; Refill: 6  - sitaGLIPtin (JANUVIA) 100 MG tablet; Take 1 tablet (100 mg total) by mouth daily. With or without food  Dispense: 30 tablet; Refill: 3    2. Weight loss    10 Ibs in 3 months. Suspect DM v nutritional. Will check a TSH and continue to monitor. At 3 mo follow up if weight continues to decline, should investigate further, endocrine v metabolic v malignancy?     - Thyroid Stimulating Hormone (TSH)    Return for Recheck in 3 months . or earlier if needed.    Total time spent with patient was 15 minutes with >50% of time spent in face-to-face counseling regarding the above plan     Munir Marie MD  [facility]

## 2021-06-11 NOTE — PROGRESS NOTES
MTM Encounter    Assessment/Plan  Diabetes: Controlled to goal of 7%. Christel major concern for this visit is cost of his medication and ability to manage his medications at home as he does not know how much longer he will be able to have a home care nurse. Due to the cost of the Januvia, we discussed discontinuing the Januvia and increasing his pioglitazone dose. Yeyo is agreeable to this plan. For primary prevention he is on a statin and aspirin, but not on an ACE or ARB. His most recent microalbumin to creatinine ratio was elevated. We discussed that diabetes can cause long-term kidney damage and an ACE or an ARB can help prevent this. Yeyo is agreeable to start lisinopril at this time, but we will stop his potassium and check a baseline bmp today. We will also check a lipid panel today to assess the atorvastatin therapy, since it has been ~1 year since his last one.  - Stop Januvia  - Increase to pioglitazone 45 mg daily  - Start lisinopril 20 mg daily  - Stop potassium   - Check BMP and lipid panel  - Referral to [facility] for home healthcare services    Hx of Unexplained Night Sweats: Yeyo does not seemed to be as concerned about this today. We discussed that a humidifier may help with the nose bleeds and dry mouth, but could worsen the night sweats. His last CBC showed a significant drop from the previous one so we will repeat that today to assess if there is an underlying cause of the night sweats.  - Check CBC w/ differential     Follow Up  MTM follow-up at the request of the provider or the patient      Subjective/Objective  Yeyo Dejesus is a 61 y.o. male here for a medication therapy management (MTM) appointment; his chief concern today is medication management and cost management. Yeyo uses a home healthcare nurse to help set up his medications.     Diabetes: Yeyo is taking Januvia, metformin, glipizide, pioglitazone.  Yeyo states that he only medication he knows he is taking is his  metformin. He has a nurse that comes in once per week to set up his medications and check his blood sugars. He is unable to check his blood sugars on his own due to loss of vision. His nurse usually comes in the afternoon and random blood sugars at that time are: usually 181-191, and has been as high as 230-240. Yeyo denies side effects from his medications or symptoms of hypoglycemia     Last A1c: 7%   Lab Results   Component Value Date    HGBA1C 7.0 (H) 04/24/2017     Last microalbumin/creatinine: 7/18/2016  Last diabetic foot exam: June '17  On aspirin: Yes  On statin: moderate intensity  Lifestyle interventions:lifestyle education regarding diet; discussed that he does walk frequently and tries to eat healthy but does occasionally have cookies. We discussed trying to limit this and eating at home as able, rather than eating in the cafeteria.     BP Readings from Last 3 Encounters:   06/22/17 122/62   06/19/17 134/72   06/14/17 112/62     Hx of unexplained night sweats: He seems more concerned about waking up with nose bleeds and a dry mouth at this point in time. He does note the he does not use air conditioning or a humidifier.    ----------------    Yeyo's medication list was reviewed with them, discussing reason for use, directions for use, and potential side effects of each medication as needed. Indication, safety, efficacy, and convenience was assessed for all medications addressed above.  No environmental factors were noted currently affecting patient.  This care plan was communicated via EMR with his primary care provider, Melina Goins MD, who is the authorizing prescriber for this visit.  Direct supervision was available by either the patient's PCP or other available provider.  Time and complexity billing metrics are included in the docflowsheet linked to this visit.  Time spent: 60 minutes      Dre Myrick, Ebony  [facility] Pharmacist  [place]  [facility]  [phone]

## 2021-06-11 NOTE — PROGRESS NOTES
OFFICE VISIT NOTE      Assessment & Plan   Yeyo Dejesus is a 61 y.o. male establishing care with me. He recently moved closer to [facility] and he hopes to find one doctor to see on a regular basis. He has been working with the Diabetic educator and has followed recommendations such that his diabetes is markedly improved. He does not report any symptoms of low sugars, so i will see our pharmacist for optimal med adjustment. I think he also needs to add an Ace inhibitor to protect his kidneys.    Additionally, given his diabetes, blindness and all, he should see the podiatrist every 2 months for foot care!    He will try better hydration and protection of his nose to control the nose bleeds - including use of vaseline inside the nose and breathing humid air. We reviewed the proper way to stop a nose bleed (pinch the front of the nose and lean forward).    Urinary frequency - He reports this, despite the A1-C of 7.0... No obvious infection in his urine. Will follow this. I wonder if he has prostate enlargement as he reports some difficulty at times, getting his urine stream started + nocturia. Will eval this more at his next visit. With the night sweats he reports, I do not want to let this go too long.      Diagnoses and all orders for this visit:    Epistaxis    Urinary frequency  -     Urinalysis-UC if Indicated    Retinitis Pigmentosa- BLIND    Hearing loss, bilateral    Type 2 diabetes mellitus with other ophthalmic complication, without long-term current use of insulin    Familial hypercholesterolemia    Unexplained night sweats      Melina Goins MD              Subjective:   Chief Complaint:  Establish Care (Physical. Nausea and Nosebleeds in the morning x1 week)    61 y.o. male.     1) 2 weeks ago - right nostril as bleeding a lot, spontaneously, not after any injury; he does not pick his nose.    2) right foot - occasional numbness - he had an injury to the top of the foot recently, but it's the  bottom with numbness; he does his own foot care including cutting his toenails (!!!) He did not know a podiatrist would be covered.    3) Night sweats - these are not nightly but they are bad when they happen. It's been a few months that he's had them    Born in [place]  Raised in [facility]    Needs someone to update his phone numbers  Gets help from the [facility]    Current Outpatient Prescriptions   Medication Sig     aspirin 81 MG EC tablet Take 81 mg by mouth daily.     atorvastatin (LIPITOR) 10 MG tablet TAKE 1 TABLET BY MOUTH AT BEDTIME     folic acid (FOLVITE) 1 MG tablet TAKE 1 TABLET BY MOUTH DAILY     glipiZIDE (GLUCOTROL) 10 MG 24 hr tablet Take 1 tablet (10 mg total) by mouth 2 (two) times a day.     metFORMIN (GLUCOPHAGE) 1000 MG tablet Take 1 tablet (1,000 mg total) by mouth 2 (two) times a day with meals.     multivitamin therapeutic (THERAGRAN) tablet Take 1 tablet by mouth daily.     pioglitazone (ACTOS) 30 MG tablet Take 1 tablet (30 mg total) by mouth daily.     potassium chloride SA (K-DUR,KLOR-CON) 20 MEQ tablet Take 1 tablet (20 mEq total) by mouth daily.     blood glucose test strips Use 1 each As Directed 2 (two) times a day. Dispense brand per patient's insurance at pharmacy discretion.     generic lancets Use 1 each As Directed 2 (two) times a day. Sig. Use to check blood glucose 1-2 times daily.     sitaGLIPtin (JANUVIA) 100 MG tablet Take 1 tablet (100 mg total) by mouth daily. With or without food     thiamine 100 MG tablet Take 1 tablet (100 mg total) by mouth daily.       PSFHx: appropriate sections of PMH completed/filled in   Tobacco Status:  He  reports that he has been smoking.  He has a 15.00 pack-year smoking history. He has never used smokeless tobacco.    Review of Systems:  No fever.  No rashes that he's aware of. No diarrhea or constipation.    Objective:    /62 (Patient Site: Left Arm, Patient Position: Sitting, Cuff  "Size: Adult Regular)  Pulse 70  Temp 98  F (36.7  C) (Oral)   Ht 5' 11\" (1.803 m)  Wt 174 lb (78.9 kg)  SpO2 99%  BMI 24.27 kg/m2  GENERAL: No acute distress, a bit talkative and speech is not completely clear yet he is understandable; wearing hearing aids, eyes constantly shift around  Nose: no current bleeding, no scabs at the nostril entry; no erythema inside  NECK: supple, no LA, nontender  Skin: no rash or obvious lesions on his upper body but on the feet there is some peeling skin, thickened calluses and some flaking; a healing abrasion on the upper side of the right great toe; toenails are well trimmed    UA with glucose and protein    Greater than 60 minutes spent with patient, with greater than 50% of time spent in counseling, consultation and care coordination regarding problems detailed above.          "

## 2021-06-12 NOTE — PROGRESS NOTES
Admission History & Physical  Yeyo Dejesus, 1955, [id number]    [facility]  Melina Goins MD, [phone]    Extended Emergency Contact Information  Primary Emergency Contact: Lou Franco   [place]  Home Phone: [phone]  Work Phone: [phone]  Relation: Other     Assessment and Plan:   Assessment: Onychomycosis, onychauxis, diabetes mellitus  Plan: Debrided hallux nails both feet  Active Problems:    * No active hospital problems. *      Chief Complaint:  Long thick nails both great toes     HPI:    Yeyo Dejesus is a 61 y.o. old male who presented to the clinic today complaining of long thick nails on both great toes.  Patient is legally blind.  However he is able to manage to trim his nails.  He stated that he is unable to trim the big toenails because they are extremely thick.  He has no pain, he does have some numbness on the bottom of both feet.  He denies any other previous treatment.  History is provided by patient    Medical History  Active Ambulatory (Non-Hospital) Problems    Diagnosis     Depressive disorder     Alcohol abuse with alcohol-induced disorder     Substance induced mood disorder     Retinitis Pigmentosa- BLIND     Hearing Loss     Type 2 diabetes mellitus     Hyperlipemia     Past Medical History:   Diagnosis Date     Depression      Diabetes mellitus      HL (hearing loss)      Hyperlipidemia      Vision loss 2001     Patient Active Problem List    Diagnosis Date Noted     Depressive disorder 02/10/2016     Alcohol abuse with alcohol-induced disorder 02/10/2016     Substance induced mood disorder 02/10/2016     Retinitis Pigmentosa- BLIND      Hearing Loss      Type 2 diabetes mellitus      Hyperlipemia      Surgical History  He  has a past surgical history that includes lap,inguinal hernia repr,initial and Hernia repair (Right, age 15 ).   Past Surgical History:   Procedure Laterality Date     HERNIA REPAIR Right age 15     inguinal      CAROLINE COHEN,INGUINAL HERNIA REPR,INITIAL      Description: Laparoscopy Repair Of Initial Inguinal Hernia;  Recorded: 01/04/2011;  Comments: Left    Social History  Reviewed, and he  reports that he has been smoking.  He has a 15.00 pack-year smoking history. He has never used smokeless tobacco. He reports that he drinks alcohol. He reports that he does not use illicit drugs.  Social History   Substance Use Topics     Smoking status: Current Every Day Smoker     Packs/day: 0.50     Years: 30.00     Smokeless tobacco: Never Used     Alcohol use Yes      Allergies  No Known Allergies Family History  Reviewed, and family history includes Diabetes in his mother.   Psychosocial Needs  Social History     Social History Narrative        Family in [place]    Daughter in [place] -not close since he  his wife            [facility] [employer] in [place]     Additional psychosocial needs reviewed per nursing assessment.       Prior to Admission Medications     (Not in a hospital admission)        Review of Systems - Negative     /72  Pulse 66  Resp 14  SpO2 96%    Objective findings: General: The patient is alert and in no acute distress      Integument: Nails bilateral feet are normal length.  Both great toenails are elongated and 2-3 times normal thickness.Skin bilateral feet warm and intact.      Vascular: DP and PT pulses +2/4 bilateral feet.      Neurologic: Negative clonus, negative Babinski bilaterally. Negative Tinel sign when percussing the tarsal tunnel area bilateral feet.      Musculoskeletal: Range of motion within normal limits bilaterally. Muscle power +4/5 bilaterally in all compartments.      Assessment:  Onychomycosis, onychauxis, diabetes mellitus      Plan: Debrided the hallux nails both feet today.  I recommended the patient return to the clinic every 3 months for continued diabetic foot care.

## 2021-06-12 NOTE — PROGRESS NOTES
OFFICE VISIT NOTE      Assessment & Plan   Yeyo Dejesus is a 61 y.o. male here for a diabetes check. We'll leave his meds the same as there were some changes made with the pharmacist which might be shifting things yet. Yeyo agrees.  Wrote a letter for him to [employer] to get voice services.  Check CBC and BMP since he's now on an ACE-I.  See podiatry for foot care.    Diagnoses and all orders for this visit:    Diabetes mellitus  -     Glycosylated Hemoglobin A1c  -     Basic Metabolic Panel  -     HM1(CBC and Differential)  -     HM1 (CBC with Diff)        Melina Goins MD              Subjective:   Chief Complaint:  Diabetes    61 y.o. male.     1) feeling good, he notes appreciation for the help from the pharmacist; he's taking his meds as directed. Says a place called the [employer] might help him to get a blood sugar comfort that he is able to use, being blind    2) needs a form completed for the voice-activated help he gets from [employer].  3) no new urination difficulties - same relative frequency in urination    Current Outpatient Prescriptions   Medication Sig     aspirin 81 MG EC tablet Take 81 mg by mouth daily.     atorvastatin (LIPITOR) 10 MG tablet TAKE 1 TABLET BY MOUTH AT BEDTIME     blood glucose test strips Use 1 each As Directed 2 (two) times a day. Dispense brand per patient's insurance at pharmacy discretion.     folic acid (FOLVITE) 1 MG tablet TAKE 1 TABLET BY MOUTH DAILY     generic lancets Use 1 each As Directed 2 (two) times a day. Sig. Use to check blood glucose 1-2 times daily.     glipiZIDE (GLUCOTROL) 10 MG 24 hr tablet Take 1 tablet (10 mg total) by mouth 2 (two) times a day.     lisinopril (PRINIVIL,ZESTRIL) 20 MG tablet Take 1 tablet (20 mg total) by mouth daily.     metFORMIN (GLUCOPHAGE) 1000 MG tablet Take 1 tablet (1,000 mg total) by mouth 2 (two) times a day with meals.     multivitamin therapeutic (THERAGRAN) tablet Take 1 tablet by mouth daily.     pioglitazone (ACTOS)  "45 MG tablet Take 1 tablet (45 mg total) by mouth daily.     thiamine 100 MG tablet Take 1 tablet (100 mg total) by mouth daily.       PSFHx: appropriate sections of PMH completed/filled in   Tobacco Status:  He  reports that he has been smoking.  He has a 15.00 pack-year smoking history. He has never used smokeless tobacco.    Review of Systems:  No fever.  No rash.    Objective:    /60 (Patient Site: Left Arm, Patient Position: Sitting, Cuff Size: Adult Regular)  Pulse 69  Temp 97.5  F (36.4  C) (Oral)   Ht 5' 11\" (1.803 m)  Wt 174 lb (78.9 kg)  SpO2 99%  BMI 24.27 kg/m2  GENERAL: No acute distress.  Ht; reg s1s2  Lungs: clear  Gait - steady  Mood: good    Reviewed labs  Wrote letter    Greater than 40 minutes spent with patient, with greater than 50% of time spent in counseling, consultation and care coordination regarding problems detailed above.          "

## 2021-06-14 NOTE — PROGRESS NOTES
OFFICE VISIT NOTE      Assessment & Plan   Yeyo Dejesus is a 61 y.o. male who is blind, has type 2 diabetes and has recently has decreased appetite and weight loss. Will try a decreased dose of metformin to see if that affects his appetite at all + check labs for other reasons. Meanwhile, he'll try meclizine prn for dizziness and a bit of nausea.    Cerumen impaction - nurse did a ear wash to clear this out --it might help the dizziness + his hearing    Flu shot today    Diagnoses and all orders for this visit:    T2DM (type 2 diabetes mellitus)  -     metFORMIN (GLUCOPHAGE) 1000 MG tablet; Take 0.5 tablets (500 mg total) by mouth 2 (two) times a day with meals.  Dispense: 60 tablet; Refill: 11    Need for immunization against influenza  -     Influenza, Seasonal,Quad Inj, 36+ MOS    Appetite loss  -     Basic Metabolic Panel  -     Amylase  -     Hepatic Profile  -     HM1(CBC and Differential)  -     HM1 (CBC with Diff)  -     Ear cerumen removal; Future    Cerumen impaction  -     Ear cerumen removal; Future    Retinitis Pigmentosa- BLIND    Hearing loss    Other orders  -     meclizine (ANTIVERT) 12.5 mg tablet; Take 1 tablet (12.5 mg total) by mouth 3 (three) times a day as needed for dizziness or nausea.  Dispense: 30 tablet; Refill: 1        Melina Goins MD    The following are part of a depression follow up plan for the patient:  coping support assessment          Subjective:   Chief Complaint:  Follow-up (medications) and Cerumen Impaction (per hearing aid staff)    61 y.o. male.     1) appetite down - no stomach ache, some dizziness, standing up too fast  In a new apartment (April 2017)  Got new hearing aids  Bowels are OK - nothing wanted     2) left ear is full of wax    3) new building is no smoking. He's working on quitting    Current Outpatient Prescriptions   Medication Sig Note     aspirin 81 MG EC tablet Take 81 mg by mouth daily.      folic acid (FOLVITE) 1 MG tablet TAKE 1 TABLET BY  "MOUTH DAILY      glipiZIDE (GLUCOTROL) 10 MG 24 hr tablet Take 1 tablet (10 mg total) by mouth 2 (two) times a day.      lisinopril (PRINIVIL,ZESTRIL) 20 MG tablet Take 1 tablet (20 mg total) by mouth daily.      metFORMIN (GLUCOPHAGE) 1000 MG tablet Take 0.5 tablets (500 mg total) by mouth 2 (two) times a day with meals.      pioglitazone (ACTOS) 45 MG tablet Take 1 tablet (45 mg total) by mouth daily.      atorvastatin (LIPITOR) 10 MG tablet Take 1 tablet (10 mg total) by mouth at bedtime.      blood glucose test strips Use 1 each As Directed 2 (two) times a day. Dispense brand per patient's insurance at pharmacy discretion.      generic lancets Use 1 each As Directed 2 (two) times a day. Sig. Use to check blood glucose 1-2 times daily.      KLOR-CON M20 20 mEq tablet  11/14/2017: Received from: External Pharmacy     meclizine (ANTIVERT) 12.5 mg tablet Take 1 tablet (12.5 mg total) by mouth 3 (three) times a day as needed for dizziness or nausea.      multivitamin therapeutic (THERAGRAN) tablet Take 1 tablet by mouth daily.      thiamine 100 MG tablet Take 1 tablet (100 mg total) by mouth daily.        PSFHx: appropriate sections of PMH completed/filled in   Tobacco Status:  He  reports that he has been smoking.  He has a 15.00 pack-year smoking history. He has never used smokeless tobacco.    Review of Systems:  No fever.  No rash.    Objective:    /50 (Cuff Size: Adult Regular)  Pulse 73  Temp 98.3  F (36.8  C) (Oral)   Resp 20  Ht 5' 11\" (1.803 m)  Wt 167 lb (75.8 kg)  SpO2 93%  BMI 23.29 kg/m2  GENERAL: No acute distress.  Mood: good - he is alert and interactive  Judgment: good  Insight: good    Gait: steady  Greater than 25 minutes spent with patient, with greater than 50% of time spent in counseling, consultation and care coordination regarding problems detailed above.          Current Outpatient Prescriptions   Medication Sig Dispense Refill     aspirin 81 MG EC tablet Take 81 mg by mouth " daily.       folic acid (FOLVITE) 1 MG tablet TAKE 1 TABLET BY MOUTH DAILY 90 tablet 1     glipiZIDE (GLUCOTROL) 10 MG 24 hr tablet Take 1 tablet (10 mg total) by mouth 2 (two) times a day. 60 tablet 4     lisinopril (PRINIVIL,ZESTRIL) 20 MG tablet Take 1 tablet (20 mg total) by mouth daily. 30 tablet 11     metFORMIN (GLUCOPHAGE) 1000 MG tablet Take 0.5 tablets (500 mg total) by mouth 2 (two) times a day with meals. 60 tablet 11     pioglitazone (ACTOS) 45 MG tablet Take 1 tablet (45 mg total) by mouth daily. 30 tablet 6     atorvastatin (LIPITOR) 10 MG tablet Take 1 tablet (10 mg total) by mouth at bedtime. 90 tablet 0     blood glucose test strips Use 1 each As Directed 2 (two) times a day. Dispense brand per patient's insurance at pharmacy discretion. 200 each 3     generic lancets Use 1 each As Directed 2 (two) times a day. Sig. Use to check blood glucose 1-2 times daily. 200 each 3     KLOR-CON M20 20 mEq tablet   0     meclizine (ANTIVERT) 12.5 mg tablet Take 1 tablet (12.5 mg total) by mouth 3 (three) times a day as needed for dizziness or nausea. 30 tablet 1     multivitamin therapeutic (THERAGRAN) tablet Take 1 tablet by mouth daily.       thiamine 100 MG tablet Take 1 tablet (100 mg total) by mouth daily.       No current facility-administered medications for this visit.      Diabetes Mellitus: well controlled

## 2021-06-14 NOTE — PROGRESS NOTES
Bilateral ear lavage with minimal success.  Patient however stated ears and hearing aids felt much better.

## 2021-06-15 NOTE — PROGRESS NOTES
Subjective findings: The patient return to the clinic today complaining of long thick painful hallux nails bilateral feet.    Objective findings:Nails bilateral feet are normal length.  Both great toenails are elongated and 2-3 times normal thickness.Skin bilateral feet warm and intact.  DP and PT pulses +2/4 bilateral feet.  Negative clonus, negative Babinski bilaterally. Negative Tinel sign when percussing the tarsal tunnel area bilateral feet.   Range of motion within normal limits bilaterally. Muscle power +4/5 bilaterally in all compartments.     Assessment:  Onychomycosis, onychauxis, diabetes mellitus      Plan: Debrided the hallux nails both feet today.  I recommended the patient return to the clinic every 3 months for continued diabetic foot care.

## 2021-06-15 NOTE — PROGRESS NOTES
OFFICE VISIT NOTE      Assessment & Plan   Yeyo Dejesus is a 62 y.o. male with diabetes and a mild URI; he's doing well overall. He will try cetirizine prn sneezing.    He denies any problems with urination at this time - no problems getting his stream going or frequency.    He requests viagra to help him be able to have sex.      Diagnoses and all orders for this visit:    Diabetes mellitus type 2 in nonobese  -     Glycosylated Hemoglobin A1c    Impotence    Depressive disorder    Retinitis Pigmentosa- BLIND    Type 2 diabetes mellitus with other ophthalmic complication, without long-term current use of insulin    Other orders  -     cetirizine (ZYRTEC) 10 MG tablet; Take 1 tablet (10 mg total) by mouth daily.  Dispense: 30 tablet; Refill: 2  -     sildenafil (VIAGRA) 100 MG tablet; Take 0.5 tablets (50 mg total) by mouth as needed for erectile dysfunction.  Dispense: 20 tablet; Refill: 0        Melina Goins MD              Subjective:   Chief Complaint:  Follow-up (diabetes) and sneezing    62 y.o. male.     1) URI - lots of sneezing recently    2) taking DM meds - no longer walks around barefoot; no problems he knows of    3) knees hurt at times, uses a rub on it    4) urine stream is good, no problems with frequency either    5) wants viagra for sex with a friend who is interested    Current Outpatient Prescriptions   Medication Sig Note     aspirin 81 MG EC tablet Take 81 mg by mouth daily.      atorvastatin (LIPITOR) 10 MG tablet Take 1 tablet (10 mg) by mouth at bedtime      blood glucose test strips Use 1 each As Directed 2 (two) times a day. Dispense brand per patient's insurance at pharmacy discretion.      cetirizine (ZYRTEC) 10 MG tablet Take 1 tablet (10 mg total) by mouth daily.      folic acid (FOLVITE) 1 MG tablet TAKE 1 TABLET BY MOUTH DAILY      generic lancets Use 1 each As Directed 2 (two) times a day. Sig. Use to check blood glucose 1-2 times daily.      glipiZIDE (GLUCOTROL) 10 MG 24  "hr tablet Take 1 tablet (10 mg total) by mouth 2 (two) times a day.      KLOR-CON M20 20 mEq tablet  11/14/2017: Received from: External Pharmacy     lisinopril (PRINIVIL,ZESTRIL) 20 MG tablet Take 1 tablet (20 mg total) by mouth daily.      meclizine (ANTIVERT) 12.5 mg tablet Take 1 tablet (12.5 mg total) by mouth 3 (three) times a day as needed for dizziness or nausea.      metFORMIN (GLUCOPHAGE) 1000 MG tablet Take 0.5 tablets (500 mg total) by mouth 2 (two) times a day with meals.      multivitamin therapeutic (THERAGRAN) tablet Take 1 tablet by mouth daily.      pioglitazone (ACTOS) 45 MG tablet Take 1 tablet (45 mg total) by mouth daily.      sildenafil (VIAGRA) 100 MG tablet Take 0.5 tablets (50 mg total) by mouth as needed for erectile dysfunction.      thiamine 100 MG tablet Take 1 tablet (100 mg total) by mouth daily.        PSFHx: appropriate sections of PMH completed/filled in   Tobacco Status:  He  reports that he has been smoking Cigarettes.  He has a 9.00 pack-year smoking history. He has never used smokeless tobacco.    Review of Systems:  No fever.  No cough.    Objective:    /58 (Patient Site: Left Arm, Patient Position: Sitting, Cuff Size: Adult Regular)  Pulse 73  Temp 98.5  F (36.9  C) (Oral)   Ht 5' 10\" (1.778 m)  Wt 162 lb 12 oz (73.8 kg)  SpO2 99%  BMI 23.35 kg/m2  GENERAL: No acute distress.  HEENT: TMs clear; nose with clear congestion and some swelling inside  NECK: supple, no LA  Ht: reg s1s2  Lungs: clear with good aeration    Feet: skin is dry with some flaking, but no wounds or erythema, no large calluses, nails are neatly trimmed    Greater than 25 minutes spent with patient, with greater than 50% of time spent in counseling, consultation and care coordination regarding problems detailed above.          "

## 2021-06-17 NOTE — PROGRESS NOTES
Subjective findings: The patient return to the clinic today complaining of long thick painful hallux nails bilateral feet.     Objective findings:Nails bilateral feet are normal length.  Both great toenails are elongated and 2-3 times normal thickness.Skin bilateral feet warm and intact.  DP and PT pulses +2/4 bilateral feet.  Negative clonus, negative Babinski bilaterally. Negative Tinel sign when percussing the tarsal tunnel area bilateral feet.   Range of motion within normal limits bilaterally. Muscle power +4/5 bilaterally in all compartments.      Assessment:  Onychomycosis, onychauxis, diabetes mellitus      Plan: Debrided the hallux nails both feet today.  I recommended the patient return to the clinic every 3 months for continued diabetic foot care

## 2021-06-17 NOTE — PROGRESS NOTES
OFFICE VISIT NOTE      Assessment & Plan   Yeyo Dejesus is a 62 y.o. male who has struggled with cerumen impaction - today his canals are CLEAR.    Depression - he feels he's doing well with his mood. With spring and warm weather, he's walking more.    Blindness /hearing loss - he's coping and has aids to help him.    Diagnoses and all orders for this visit:    Depressive disorder    Type 2 diabetes mellitus with other ophthalmic complication, without long-term current use of insulin  -     lisinopril (PRINIVIL,ZESTRIL) 20 MG tablet; Take 1 tablet (20 mg total) by mouth daily.  Dispense: 90 tablet; Refill: 4  -     pioglitazone (ACTOS) 45 MG tablet; Take 1 tablet (45 mg total) by mouth daily.  Dispense: 30 tablet; Refill: 6  -     glipiZIDE (GLUCOTROL XL) 10 MG 24 hr tablet; Take 1 tablet (10 mg total) by mouth 2 (two) times a day.  Dispense: 60 tablet; Refill: 4    Hearing loss    Retinitis Pigmentosa- BLIND      Melina Goins MD    The following are part of a depression follow up plan for the patient:  coping support assessment and coping support management          Subjective:   Chief Complaint:  Follow-up (allergies, review lab work) and Ear Problem (check for wax)    62 y.o. male.     1) feeling good, exercising more. Mood is good, eating better. Sleeping better, too.    2) retired up here to be by his daughter. Feels like things are going well. He knows he has an upcoming appointment - we will do labs then as they will be due then.    Current Outpatient Prescriptions   Medication Sig Note     aspirin 81 MG EC tablet Take 81 mg by mouth daily.      atorvastatin (LIPITOR) 10 MG tablet Take 1 tablet (10 mg) by mouth at bedtime      cetirizine (ZYRTEC) 10 MG tablet Take 1 tablet (10 mg total) by mouth daily.      folic acid (FOLVITE) 1 MG tablet TAKE 1 TABLET BY MOUTH DAILY      glipiZIDE (GLUCOTROL XL) 10 MG 24 hr tablet Take 1 tablet (10 mg total) by mouth 2 (two) times a day.      KLOR-CON M20 20 mEq  "tablet  11/14/2017: Received from: External Pharmacy     lisinopril (PRINIVIL,ZESTRIL) 20 MG tablet Take 1 tablet (20 mg total) by mouth daily.      meclizine (ANTIVERT) 12.5 mg tablet TAKE ONE TABLET BY MOUTH THREE TIMES DAILY AS NEEDED FOR NAUSEA or dizziness      metFORMIN (GLUCOPHAGE) 1000 MG tablet Take 0.5 tablets (500 mg total) by mouth 2 (two) times a day with meals.      multivitamin therapeutic (THERAGRAN) tablet Take 1 tablet by mouth daily.      pioglitazone (ACTOS) 45 MG tablet Take 1 tablet (45 mg total) by mouth daily.      sildenafil (VIAGRA) 100 MG tablet Take 0.5 tablets (50 mg total) by mouth as needed for erectile dysfunction.      thiamine 100 MG tablet Take 1 tablet (100 mg total) by mouth daily.      blood glucose test strips Use 1 each As Directed 2 (two) times a day. Dispense brand per patient's insurance at pharmacy discretion.      generic lancets Use 1 each As Directed 2 (two) times a day. Sig. Use to check blood glucose 1-2 times daily.        PSFHx: appropriate sections of PMH completed/filled in   Tobacco Status:  He  reports that he has been smoking Cigarettes.  He has a 9.00 pack-year smoking history. He has never used smokeless tobacco.    Review of Systems:  No fever.  No constipation or diarrhea.    Objective:    /60  Pulse 64  Temp 98.2  F (36.8  C) (Oral)   Resp 20  Ht 5' 10\" (1.778 m)  Wt 170 lb 4 oz (77.2 kg)  BMI 24.43 kg/m2  GENERAL: No acute distress.  HEENT: ear wax is clear    Mood: good, joking  Judgment: good  Insight: good    Visit was 15 min long    "

## 2021-06-18 NOTE — PROGRESS NOTES
OFFICE VISIT NOTE      Assessment & Plan   Yeyo Dejesus is a 62 y.o. male who is blind and has diabetes. He reports having a hard week last week - feeling tired, weak and dizzy. He kept taking his meds and this week he feels better. He mentioned having bloody noses, however I think somehow he missed the fact he likely had a GI bleed- which is understandable since he's blind. I did not put all this together until after his appointment and when I saw his Hgb.  The Bleed is long enough ago that he does not require hospitalization at this time, however, he'll need close monitoring, iron supplements and GI follow up (either Cologuard or a colonoscopy).       Diagnoses and all orders for this visit:    GI bleed    Urinary frequency  -     Urinalysis-UC if Indicated  -     tamsulosin (FLOMAX) 0.4 mg Cp24; Take 1 capsule (0.4 mg total) by mouth daily.  Dispense: 30 capsule; Refill: 2    Diabetes mellitus  -     Glycosylated Hemoglobin A1c  -     HM1(CBC and Differential)  -     Basic Metabolic Panel  -     HM1 (CBC with Diff)    Familial hypercholesterolemia  -     Lipid Cascade    Bleeding nose  -     sodium chloride 0.65 % Drop; 1 spray into each nostril 4 (four) times a day.  Dispense: 30 mL; Refill: 11        Melina Goins MD    The following are part of a depression follow up plan for the patient:  coping support assessment          Subjective:   Chief Complaint:  Diabetes and Urinary Frequency    62 y.o. male.     1) had a couple days last week when he did not feel well - balance was off.  He had to hold onto something while walking. Lasted 4 days. medclizine helped only a little  Did not fall.  Someone came to his apartment to clean up - they said there was blood on the bathroom floor. He figured it was from his nose. He kept taking his meds and eating and eventually he felt a bit better    2) is happy with his weight gain; he has been trying to eat healthily but enough to gain some muscle    Current  Outpatient Prescriptions   Medication Sig Note     aspirin 81 MG EC tablet Take 81 mg by mouth daily.      atorvastatin (LIPITOR) 10 MG tablet Take 1 tablet (10 mg) by mouth at bedtime      blood glucose test strips Use 1 each As Directed 2 (two) times a day. Dispense brand per patient's insurance at pharmacy discretion.      cetirizine (ZYRTEC) 10 MG tablet Take 1 tablet (10 mg total) by mouth daily.      folic acid (FOLVITE) 1 MG tablet TAKE 1 TABLET BY MOUTH DAILY      generic lancets Use 1 each As Directed 2 (two) times a day. Sig. Use to check blood glucose 1-2 times daily.      glipiZIDE (GLUCOTROL XL) 10 MG 24 hr tablet Take 1 tablet (10 mg total) by mouth 2 (two) times a day.      KLOR-CON M20 20 mEq tablet  11/14/2017: Received from: External Pharmacy     lisinopril (PRINIVIL,ZESTRIL) 20 MG tablet Take 1 tablet (20 mg total) by mouth daily.      meclizine (ANTIVERT) 12.5 mg tablet TAKE ONE TABLET BY MOUTH THREE TIMES DAILY AS NEEDED FOR NAUSEA or dizziness      metFORMIN (GLUCOPHAGE) 1000 MG tablet Take 0.5 tablets (500 mg total) by mouth 2 (two) times a day with meals.      multivitamin therapeutic (THERAGRAN) tablet Take 1 tablet by mouth daily.      pioglitazone (ACTOS) 45 MG tablet Take 1 tablet (45 mg total) by mouth daily.      sildenafil (VIAGRA) 100 MG tablet Take 0.5 tablets (50 mg total) by mouth as needed for erectile dysfunction.      sodium chloride 0.65 % Drop 1 spray into each nostril 4 (four) times a day.      tamsulosin (FLOMAX) 0.4 mg Cp24 Take 1 capsule (0.4 mg total) by mouth daily.      thiamine 100 MG tablet Take 1 tablet (100 mg total) by mouth daily.        PSFHx: appropriate sections of PMH completed/filled in   Tobacco Status:  He  reports that he has been smoking Cigarettes.  He has a 9.00 pack-year smoking history. He has never used smokeless tobacco.    Review of Systems:  No fever.  No nasal blood seen.    Objective:    /60 (Patient Site: Left Arm, Patient Position:  "Standing, Cuff Size: Adult Regular)  Pulse 63  Temp 98  F (36.7  C) (Oral)   Resp 22  Ht 5' 10\" (1.778 m)  Wt 175 lb 4 oz (79.5 kg)  SpO2 99% Comment: RA  BMI 25.15 kg/m2  GENERAL: No acute distress.  HEENT: nose with crusted blood inside but nothing fresh and drainking  Ht: reg s1s2  Lungs: clear with good aeration    Hgb  9.3  A1-C 6.3    Spent 25 min face to face with patient with more the 50% spent in counseling, reviewing chart, and coordination of care and discussing ways to avoid dizziness, how to take his meds and exercise.        "

## 2021-06-20 NOTE — PROGRESS NOTES
Second attempt to reach patient for hospital discharge follow up, no answer.  LVM reminding pt of their appt 9/14/18, w/Dr. Goins, at 10:00am.  RN has made two unsuccessful attempts to reach patient.  Encounter closed.    Iqra Dolan RN Care Manager, Population Health

## 2021-06-20 NOTE — PROGRESS NOTES
Hospital discharge follow up call to pt, no answer.  LVM that RN will try back another time, & reminded pt of their upcoming hospital f/u appt w/Dr. Goins, 9/14/18.    Iqra Dolan RN Care Manager, Population Health

## 2021-06-20 NOTE — LETTER
Letter by Melina Goins MD at      Author: Melina Goins MD Service: -- Author Type: --    Filed:  Encounter Date: 1/21/2020 Status: Signed        [facility] PATIENT ACCESS  [address]  [facility] [place] [place] [address]  [phone]         Yeyo Dejesus  [address]  [place] [address]        01/21/20    Dear Yeyo Dejesus,     At [facility] we care about your health and well-being. Your primary care provider is committed to ensuring you receive high quality care and has chosen a network of specialists to assist in providing that care. Recently Dr. Goins referred you to Urology for specialty care.      Please call [facility] at [phone] at your earliest convenience for assistance in scheduling an appointment.  If you have already scheduled this appointment, please disregard this notice.  Thank you for choosing [facility] for your healthcare needs.       Sincerely,       [facility] Specialty Scheduling

## 2021-06-20 NOTE — PROGRESS NOTES
OFFICE VISIT NOTE      Assessment & Plan   Yeyo Dejesus is a 62 y.o. male who was recently in the hospital for chemical abuse. He admits he gets very frustrated with life and goes to drinking. He was set up to do outpatient treatment but he feels it is too far away from home AND does not work for him. He now is certain he wants the medication that will stop him from drinking- so he asked me to prescribe it. He only wants to do AA meetings. He needs a bus pass to get there and is waiting on a  to get it for him.    Diabetes - we will do what we can with oral meds due to his blindness. He understands he cannot achieve as good of control, but he fears giving himself the right insulin dose too much to do insulin.    Depression - I hope to help him find better ways to cope with his frustrations.    Diagnoses and all orders for this visit:    Alcohol abuse with alcohol-induced disorder (H)  -     disulfiram (ANTABUSE) 250 mg tablet; Take 1 tablet (250 mg total) by mouth daily.  Dispense: 30 tablet; Refill: 3    Sensorineural hearing loss (SNHL) of both ears    Severe alcohol use disorder (H)    Substance induced mood disorder (H)    Type 2 diabetes mellitus with other ophthalmic complication, without long-term current use of insulin        Melina Goins MD    The following are part of a depression follow up plan for the patient:  under care of mental health counselor and coping support assessment          Subjective:   Chief Complaint:  Follow-up    62 y.o. male.     Inpatient 8/20-9/10    1) drinks from anger, frustration,   Doing counseling  Doing AA, did not do outpatient treatment - too far away - makes him uptight to get there  AA - needs a fare card so he can afford it    Who are the advocates?      2) eating -   Admits now he did not tell me he was drinking, was 150# upon admission  Put the past in the past - everybody keeps throwing it up at me    3) prescription for antabuse  4) doing what I  can  I know I need support  I am doing the best I can      Current Outpatient Prescriptions   Medication Sig     aspirin 81 MG EC tablet Take 81 mg by mouth daily.     folic acid (FOLVITE) 1 MG tablet TAKE 1 TABLET BY MOUTH DAILY     glipiZIDE (GLUCOTROL XL) 10 MG 24 hr tablet Take 1 tablet (10 mg total) by mouth 2 (two) times a day.     lisinopril (PRINIVIL,ZESTRIL) 5 MG tablet Take 1 tablet (5 mg total) by mouth daily.     metFORMIN (GLUCOPHAGE) 1000 MG tablet Take 1 tablet (1,000 mg total) by mouth 2 (two) times a day with meals.     multivitamin therapeutic (THERAGRAN) tablet Take 1 tablet by mouth daily.     omega-3/dha/epa/fish oil (FISH OIL-OMEGA-3 FATTY ACIDS) 300-1,000 mg capsule Take 1 g by mouth daily.     pioglitazone (ACTOS) 45 MG tablet Take 1 tablet (45 mg total) by mouth daily.     tamsulosin (FLOMAX) 0.4 mg Cp24 Take 1 capsule (0.4 mg total) by mouth daily.     ALL DAY ALLERGY, CETIRIZINE, 10 mg tablet TAKE ONE TABLET BY MOUTH ONE TIME DAILY      ascorbic acid, vitamin C, (VITAMIN C) 1000 MG tablet Take 1,000 mg by mouth daily as needed.     atorvastatin (LIPITOR) 10 MG tablet Take 1 tablet (10 mg) by mouth at bedtime     blood glucose test strips Use 1 each As Directed 2 (two) times a day. Dispense brand per patient's insurance at pharmacy discretion.     disulfiram (ANTABUSE) 250 mg tablet Take 1 tablet (250 mg total) by mouth daily.     generic lancets Use 1 each As Directed 2 (two) times a day. Sig. Use to check blood glucose 1-2 times daily.     nicotine (NICODERM CQ) 14 mg/24 hr Place 1 patch on the skin daily.     sildenafil (VIAGRA) 100 MG tablet Take 0.5 tablets (50 mg total) by mouth as needed for erectile dysfunction.     sodium chloride 0.65 % Drop 1 spray into each nostril 4 (four) times a day.     thiamine 100 MG tablet Take 1 tablet (100 mg total) by mouth daily.       PSFHx: appropriate sections of PMH completed/filled in   Tobacco Status:  He  reports that he has been smoking  "Cigarettes.  He has a 9.00 pack-year smoking history. He has never used smokeless tobacco.    Review of Systems:  No fever.  No nausea. All other systems negative except as noted above.    Objective:    /56  Pulse 84  Temp 98.1  F (36.7  C) (Oral)   Resp 16  Ht 5' 11\" (1.803 m)  Wt 171 lb (77.6 kg)  SpO2 98%  BMI 23.85 kg/m2  GENERAL: No acute distress.  Mood: hopeful  Judgment: fair  Insight: poor to fair    Spent 25 min face to face with patient with more the 50% spent in counseling, reviewing chart, and coordination of care and discussing medications, his plan for ongoing treatment (only AA) and diabetes.  Reviewed all meds.  "

## 2021-06-24 NOTE — TELEPHONE ENCOUNTER
RN cannot approve Refill Request    RN can NOT refill this medication medication not on med list     . Last office visit: 9/14/2018 Melina Goins MD Last Physical: Visit date not found Last MTM visit: Visit date not found Last visit same specialty: 9/14/2018 Melina Goins MD.  Next visit within 3 mo: Visit date not found  Next physical within 3 mo: Visit date not found      Marleen Mason, [facility] Triage/Med Refill 2/21/2019    Requested Prescriptions   Pending Prescriptions Disp Refills     meclizine (ANTIVERT) 12.5 mg tablet [Pharmacy Med Name: Meclizine HCl Oral Tablet 12.5 MG] 30 tablet 2     Sig: TAKE ONE TABLET BY MOUTH THREE TIMES DAILY AS NEEDED FOR NAUSEA or dizziness    There is no refill protocol information for this order